# Patient Record
Sex: FEMALE | Race: WHITE | NOT HISPANIC OR LATINO | Employment: OTHER | URBAN - METROPOLITAN AREA
[De-identification: names, ages, dates, MRNs, and addresses within clinical notes are randomized per-mention and may not be internally consistent; named-entity substitution may affect disease eponyms.]

---

## 2017-11-01 ENCOUNTER — TRANSCRIBE ORDERS (OUTPATIENT)
Dept: ADMINISTRATIVE | Facility: HOSPITAL | Age: 72
End: 2017-11-01

## 2017-11-01 DIAGNOSIS — R92.8 ABNORMAL MAMMOGRAPHY: Primary | ICD-10-CM

## 2017-12-15 ENCOUNTER — HOSPITAL ENCOUNTER (OUTPATIENT)
Dept: MAMMOGRAPHY | Facility: HOSPITAL | Age: 72
Discharge: HOME | End: 2017-12-15
Attending: SURGERY | Admitting: SURGERY

## 2017-12-15 DIAGNOSIS — R92.8 ABNORMAL MAMMOGRAPHY: ICD-10-CM

## 2017-12-15 PROCEDURE — G0204 DX MAMMO INCL CAD BI: HCPCS

## 2017-12-15 PROCEDURE — G0204 DX MAMMO INCL CAD BI: HCPCS | Performed by: RADIOLOGY

## 2017-12-15 PROCEDURE — G0279 TOMOSYNTHESIS, MAMMO: HCPCS | Performed by: RADIOLOGY

## 2017-12-15 PROCEDURE — G0279 TOMOSYNTHESIS, MAMMO: HCPCS

## 2018-04-16 ENCOUNTER — TRANSCRIBE ORDERS (OUTPATIENT)
Dept: ULTRASOUND IMAGING | Facility: HOSPITAL | Age: 73
End: 2018-04-16

## 2018-04-16 ENCOUNTER — HOSPITAL ENCOUNTER (OUTPATIENT)
Dept: ULTRASOUND IMAGING | Facility: HOSPITAL | Age: 73
Discharge: HOME | End: 2018-04-16
Admitting: NURSE PRACTITIONER

## 2018-04-16 DIAGNOSIS — M79.601 PAIN IN RIGHT ARM: Primary | ICD-10-CM

## 2018-04-16 DIAGNOSIS — M79.601 PAIN IN RIGHT ARM: ICD-10-CM

## 2018-04-16 PROCEDURE — 93971 EXTREMITY STUDY: CPT

## 2018-11-06 ENCOUNTER — TRANSCRIBE ORDERS (OUTPATIENT)
Dept: ADMINISTRATIVE | Facility: HOSPITAL | Age: 73
End: 2018-11-06

## 2018-11-06 DIAGNOSIS — Z12.31 VISIT FOR SCREENING MAMMOGRAM: Primary | ICD-10-CM

## 2019-02-01 ENCOUNTER — APPOINTMENT (OUTPATIENT)
Dept: MAMMOGRAPHY | Facility: HOSPITAL | Age: 74
End: 2019-02-01

## 2019-03-07 ENCOUNTER — HOSPITAL ENCOUNTER (OUTPATIENT)
Dept: MAMMOGRAPHY | Facility: HOSPITAL | Age: 74
Discharge: HOME | End: 2019-03-07
Admitting: NURSE PRACTITIONER

## 2019-03-07 DIAGNOSIS — Z12.31 VISIT FOR SCREENING MAMMOGRAM: ICD-10-CM

## 2019-03-07 PROCEDURE — 77063 BREAST TOMOSYNTHESIS BI: CPT

## 2019-03-07 PROCEDURE — 77063 BREAST TOMOSYNTHESIS BI: CPT | Performed by: RADIOLOGY

## 2019-03-07 PROCEDURE — 77067 SCR MAMMO BI INCL CAD: CPT | Performed by: RADIOLOGY

## 2019-03-07 PROCEDURE — 77067 SCR MAMMO BI INCL CAD: CPT

## 2019-12-13 ENCOUNTER — OFFICE VISIT (OUTPATIENT)
Dept: FAMILY MEDICINE CLINIC | Facility: CLINIC | Age: 74
End: 2019-12-13

## 2019-12-13 VITALS
OXYGEN SATURATION: 97 % | BODY MASS INDEX: 23.16 KG/M2 | SYSTOLIC BLOOD PRESSURE: 110 MMHG | TEMPERATURE: 98 F | DIASTOLIC BLOOD PRESSURE: 64 MMHG | HEART RATE: 72 BPM | WEIGHT: 139 LBS | HEIGHT: 65 IN

## 2019-12-13 DIAGNOSIS — H10.31 ACUTE BACTERIAL CONJUNCTIVITIS OF RIGHT EYE: ICD-10-CM

## 2019-12-13 DIAGNOSIS — R05.9 COUGH: ICD-10-CM

## 2019-12-13 DIAGNOSIS — J06.9 ACUTE URI: Primary | ICD-10-CM

## 2019-12-13 PROCEDURE — 99203 OFFICE O/P NEW LOW 30 MIN: CPT | Performed by: NURSE PRACTITIONER

## 2019-12-13 RX ORDER — LEVOTHYROXINE SODIUM 0.05 MG/1
TABLET ORAL
COMMUNITY
End: 2020-08-25 | Stop reason: SDUPTHER

## 2019-12-13 RX ORDER — PROPRANOLOL HYDROCHLORIDE 80 MG/1
CAPSULE, EXTENDED RELEASE ORAL
COMMUNITY
Start: 2019-12-04 | End: 2020-03-13 | Stop reason: SDUPTHER

## 2019-12-13 RX ORDER — DEXTROMETHORPHAN HYDROBROMIDE AND PROMETHAZINE HYDROCHLORIDE 15; 6.25 MG/5ML; MG/5ML
5 SYRUP ORAL 4 TIMES DAILY PRN
Qty: 90 ML | Refills: 0 | Status: SHIPPED | OUTPATIENT
Start: 2019-12-13 | End: 2020-01-13

## 2019-12-13 RX ORDER — ATORVASTATIN CALCIUM 10 MG/1
TABLET, FILM COATED ORAL
COMMUNITY
End: 2020-12-03 | Stop reason: SDUPTHER

## 2019-12-13 RX ORDER — CEFDINIR 300 MG/1
300 CAPSULE ORAL 2 TIMES DAILY
Qty: 14 CAPSULE | Refills: 0 | Status: SHIPPED | OUTPATIENT
Start: 2019-12-13 | End: 2019-12-20

## 2019-12-13 RX ORDER — POLYMYXIN B SULFATE AND TRIMETHOPRIM 1; 10000 MG/ML; [USP'U]/ML
1 SOLUTION OPHTHALMIC 4 TIMES DAILY
Qty: 10 ML | Refills: 0 | Status: SHIPPED | OUTPATIENT
Start: 2019-12-13 | End: 2019-12-16

## 2019-12-13 NOTE — PROGRESS NOTES
Subjective     Chief Complaint:    Chief Complaint   Patient presents with   • Establish Care   • Cough   • Nasal Congestion   • Eye Problem   • Eye Drainage       History of Present Illness:   Started having sore throat and cough on Monday. She was taking OTC medication. Last night she developed right lower cheek swelling. She notes she started having yellow drainage from the corner of her right eye. This morning she had drainage but eyelids were not stuck together. Voice was hoarse. Throat is still sore. No fever or chills.     Review of Systems   Constitutional: Negative for chills and fever.   HENT: Positive for congestion, ear pain, rhinorrhea, sinus pressure and sore throat.    Respiratory: Positive for cough. Negative for shortness of breath.    Cardiovascular: Negative for chest pain and palpitations.   Gastrointestinal: Positive for diarrhea. Negative for abdominal pain and nausea.   Neurological: Positive for headache. Negative for dizziness.   Psychiatric/Behavioral: Negative for depressed mood. The patient is not nervous/anxious.         I have reviewed and/or updated the patient's past medical, surgical, family, social history and problem list as appropriate.     Medications:    Current Outpatient Medications:   •  atorvastatin (LIPITOR) 10 MG tablet, atorvastatin 10 mg tablet  TAKE 1 TABLET DAILY, Disp: , Rfl:   •  levothyroxine (SYNTHROID, LEVOTHROID) 50 MCG tablet, levothyroxine 50 mcg tablet  TAKE 1 TABLET DAILY, Disp: , Rfl:   •  propranolol LA (INDERAL LA) 80 MG 24 hr capsule, , Disp: , Rfl:   •  Pseudoephedrine-APAP-DM (COLD/FLU DAYTIME PO), Take  by mouth., Disp: , Rfl:   •  cefdinir (OMNICEF) 300 MG capsule, Take 1 capsule by mouth 2 (Two) Times a Day for 7 days., Disp: 14 capsule, Rfl: 0  •  promethazine-dextromethorphan (PROMETHAZINE-DM) 6.25-15 MG/5ML syrup, Take 5 mL by mouth 4 (Four) Times a Day As Needed for Cough., Disp: 90 mL, Rfl: 0  •  trimethoprim-polymyxin b (POLYTRIM)  "26188-0.1 UNIT/ML-% ophthalmic solution, Administer 1 drop to the right eye 4 (Four) Times a Day for 5 days., Disp: 10 mL, Rfl: 0    Allergies:  No Known Allergies    Objective     Vital Signs:   Vitals:    12/13/19 0855   BP: 110/64   Pulse: 72   Temp: 98 °F (36.7 °C)   TempSrc: Temporal   SpO2: 97%   Weight: 63 kg (139 lb)   Height: 165.1 cm (65\")       Physical Exam:    Physical Exam   Constitutional: She is oriented to person, place, and time. She appears well-developed and well-nourished.   HENT:   Head: Normocephalic and atraumatic.   Right Ear: Tympanic membrane, external ear and ear canal normal.   Left Ear: Tympanic membrane, external ear and ear canal normal.   Nose: Congestion present.   Mouth/Throat: Uvula is midline and mucous membranes are normal. Posterior oropharyngeal erythema present.   Eyes: Right eye exhibits discharge. Right conjunctiva is injected.   Cardiovascular: Normal rate, regular rhythm and intact distal pulses. Exam reveals no gallop and no friction rub.   No murmur heard.  Pulmonary/Chest: Effort normal and breath sounds normal.   Coarse breath sounds, frequent cough   Abdominal: Soft. Bowel sounds are normal. There is no tenderness.   Lymphadenopathy:        Head (right side): No submental, no submandibular, no tonsillar, no preauricular and no posterior auricular adenopathy present.        Head (left side): No submental, no submandibular, no tonsillar, no preauricular and no posterior auricular adenopathy present.     She has no cervical adenopathy.   Neurological: She is alert and oriented to person, place, and time.   Skin: Skin is warm and dry.   Psychiatric: She has a normal mood and affect. Her behavior is normal.   Nursing note and vitals reviewed.      Assessment / Plan     Assessment/Plan:   Problem List Items Addressed This Visit     None      Visit Diagnoses     Acute URI    -  Primary    Relevant Medications    cefdinir (OMNICEF) 300 MG capsule    Cough        Relevant " Medications    promethazine-dextromethorphan (PROMETHAZINE-DM) 6.25-15 MG/5ML syrup    Acute bacterial conjunctivitis of right eye        Relevant Medications    cefdinir (OMNICEF) 300 MG capsule    trimethoprim-polymyxin b (POLYTRIM) 62064-4.1 UNIT/ML-% ophthalmic solution        -- likely h influenza infection given eye symptoms as well as URI symptoms.   -- omnicef, finish all anbx  -- polytrim eye drops, supportive care discussed  -- push fluids, prometh DM for cough    Follow up:  PRN    Electronically signed by MAKENZIE Thakkar   12/13/2019 9:04 AM      Please note that portions of this note may have been completed with a voice recognition program. Efforts were made to edit the dictations, but occasionally words are mistranscribed.

## 2019-12-16 ENCOUNTER — TELEPHONE (OUTPATIENT)
Dept: FAMILY MEDICINE CLINIC | Facility: CLINIC | Age: 74
End: 2019-12-16

## 2019-12-16 RX ORDER — OFLOXACIN 3 MG/ML
2 SOLUTION/ DROPS OPHTHALMIC 4 TIMES DAILY
Qty: 5 ML | Refills: 0 | Status: SHIPPED | OUTPATIENT
Start: 2019-12-16 | End: 2019-12-23

## 2020-01-13 ENCOUNTER — OFFICE VISIT (OUTPATIENT)
Dept: FAMILY MEDICINE CLINIC | Facility: CLINIC | Age: 75
End: 2020-01-13

## 2020-01-13 VITALS
HEART RATE: 64 BPM | BODY MASS INDEX: 22.82 KG/M2 | WEIGHT: 137 LBS | OXYGEN SATURATION: 98 % | DIASTOLIC BLOOD PRESSURE: 70 MMHG | SYSTOLIC BLOOD PRESSURE: 120 MMHG | TEMPERATURE: 97.4 F | HEIGHT: 65 IN

## 2020-01-13 DIAGNOSIS — E78.5 DYSLIPIDEMIA: ICD-10-CM

## 2020-01-13 DIAGNOSIS — E53.8 VITAMIN B12 DEFICIENCY: ICD-10-CM

## 2020-01-13 DIAGNOSIS — E03.9 ACQUIRED HYPOTHYROIDISM: Primary | ICD-10-CM

## 2020-01-13 PROCEDURE — 99214 OFFICE O/P EST MOD 30 MIN: CPT | Performed by: FAMILY MEDICINE

## 2020-01-13 RX ORDER — LANOLIN ALCOHOL/MO/W.PET/CERES
1000 CREAM (GRAM) TOPICAL DAILY
COMMUNITY

## 2020-01-13 RX ORDER — CHLORAL HYDRATE 500 MG
CAPSULE ORAL
COMMUNITY

## 2020-01-13 NOTE — PROGRESS NOTES
Established Patient        Chief Complaint:   Chief Complaint   Patient presents with   • Establish Care     former patient of [facility]        Faith Cisneros is a 74 y.o. female    History of Present Illness:   Here for establishment with a new primary care provider.  She has numerous chronic comorbid conditions, routinely followed in the past by primary care physician established with [facility].  She has known hypothyroidism, as well as dyslipidemia and vitamin B12 deficiency.  Patient states she is due for thyroid evaluation.  Denies any palpitations or chest pain.  Denies any orthopnea or swelling to her extremities.  She has maintained good urine output without hematuria or dysuria.  Denies any bright red blood or black or tarry stools.  She denies any chest pain, no exertional dyspnea.  Maintains a very active lifestyle, diverse diet.  Denies any dysphagia or aspiration.    Subjective     The following portions of the patient's history were reviewed and updated as appropriate: allergies, current medications, past family history, past medical history, past social history, past surgical history and problem list.    No Known Allergies    Review of Systems  1. Constitutional: Negative for fever. Negative for chills, diaphoresis, fatigue and unexpected weight change.   2. HENT: No dysphagia; no changes to vision/hearing/smell/taste; no epistaxis  3. Eyes: Negative for redness and visual disturbance.   4. Respiratory: negative for shortness of breath. Negative for chest pain . Negative for cough and chest tightness.   5. Cardiovascular: Negative for chest pain and palpitations.   6. Gastrointestinal: Negative for abdominal distention, abdominal pain and blood in stool.   7. Endocrine: Negative for cold intolerance and heat intolerance.   8. Genitourinary: Negative for difficulty urinating, dysuria and frequency.   9. Musculoskeletal: Negative for arthralgias, back pain and myalgias.   10. Skin:  "Negative for color change, rash and wound.   11. Neurological: Negative for syncope, weakness and headaches.   12. Hematological: Negative for adenopathy. Does not bruise/bleed easily.   13. Psychiatric/Behavioral: Negative for confusion. The patient is not nervous/anxious.    Objective     Physical Exam   Vital Signs: /70   Pulse 64   Temp 97.4 °F (36.3 °C)   Ht 165.1 cm (65\")   Wt 62.1 kg (137 lb)   SpO2 98%   BMI 22.80 kg/m²     General Appearance: alert, oriented x 3, no acute distress.  Skin: warm and dry.   HEENT: Atraumatic.  pupils round and reactive to light and accommodation, oral mucosa pink and moist.  Nares patent without epistaxis.  External auditory canals are patent tympanic membranes intact.  Neck: supple, no JVD, trachea midline.  No thyromegaly  Lungs: CTA, unlabored breathing effort.  Heart: RRR, normal S1 and S2, no S3, no rub.  Abdomen: soft, non-tender, no palpable bladder, present bowel sounds to auscultation ×4.  No guarding or rigidity.  No CVA tenderness.  Postsurgical scarring noted to the abdomen.  Extremities: no clubbing, cyanosis or edema.  Good range of motion actively and passively.  Symmetric muscle strength and development  Neuro: normal speech and mental status.  Cranial nerves II through XII intact.  No anosmia. DTR 2+; proprioception intact.  No focal motor/sensory deficits.    Assessment and Plan      Assessment:   Faith was seen today for establish care.    Diagnoses and all orders for this visit:    Acquired hypothyroidism  -     TSH  -     T4, Free    BMI 22.0-22.9, adult    Dyslipidemia  -     TSH  -     T4, Free  -     Comprehensive Metabolic Panel    Vitamin B12 deficiency        Plan:  Thyroid surveillance labs today.  Continue current thyroid supplementation dose.  Adjustment will be made based on lab results.    Vital signs stable, appears hemodynamically appropriate range.    Continue propanolol.    Continue statin therapy.    Continue B12 " "supplementation.  Discussion Summary:    Discussed plan of care in detail with pt today; pt verb understanding and agrees.  Follow up:  Return in about 6 months (around 7/13/2020) for medicare wellness.     Patient Instructions   Managing Your Hypertension  Hypertension is commonly called high blood pressure. This is when the force of your blood pressing against the walls of your arteries is too strong. Arteries are blood vessels that carry blood from your heart throughout your body. Hypertension forces the heart to work harder to pump blood, and may cause the arteries to become narrow or stiff. Having untreated or uncontrolled hypertension can cause heart attack, stroke, kidney disease, and other problems.  What are blood pressure readings?  A blood pressure reading consists of a higher number over a lower number. Ideally, your blood pressure should be below 120/80. The first (\"top\") number is called the systolic pressure. It is a measure of the pressure in your arteries as your heart beats. The second (\"bottom\") number is called the diastolic pressure. It is a measure of the pressure in your arteries as the heart relaxes.  What does my blood pressure reading mean?  Blood pressure is classified into four stages. Based on your blood pressure reading, your health care provider may use the following stages to determine what type of treatment you need, if any. Systolic pressure and diastolic pressure are measured in a unit called mm Hg.  Normal  · Systolic pressure: below 120.  · Diastolic pressure: below 80.  Elevated  · Systolic pressure: 120-129.  · Diastolic pressure: below 80.  Hypertension stage 1  · Systolic pressure: 130-139.  · Diastolic pressure: 80-89.  Hypertension stage 2  · Systolic pressure: 140 or above.  · Diastolic pressure: 90 or above.  What health risks are associated with hypertension?  Managing your hypertension is an important responsibility. Uncontrolled hypertension can lead to:  · A heart " attack.  · A stroke.  · A weakened blood vessel (aneurysm).  · Heart failure.  · Kidney damage.  · Eye damage.  · Metabolic syndrome.  · Memory and concentration problems.  What changes can I make to manage my hypertension?  Hypertension can be managed by making lifestyle changes and possibly by taking medicines. Your health care provider will help you make a plan to bring your blood pressure within a normal range.  Eating and drinking    · Eat a diet that is high in fiber and potassium, and low in salt (sodium), added sugar, and fat. An example eating plan is called the DASH (Dietary Approaches to Stop Hypertension) diet. To eat this way:  ? Eat plenty of fresh fruits and vegetables. Try to fill half of your plate at each meal with fruits and vegetables.  ? Eat whole grains, such as whole wheat pasta, brown rice, or whole grain bread. Fill about one quarter of your plate with whole grains.  ? Eat low-fat diary products.  ? Avoid fatty cuts of meat, processed or cured meats, and poultry with skin. Fill about one quarter of your plate with lean proteins such as fish, chicken without skin, beans, eggs, and tofu.  ? Avoid premade and processed foods. These tend to be higher in sodium, added sugar, and fat.  · Reduce your daily sodium intake. Most people with hypertension should eat less than 1,500 mg of sodium a day.  · Limit alcohol intake to no more than 1 drink a day for nonpregnant women and 2 drinks a day for men. One drink equals 12 oz of beer, 5 oz of wine, or 1½ oz of hard liquor.  Lifestyle  · Work with your health care provider to maintain a healthy body weight, or to lose weight. Ask what an ideal weight is for you.  · Get at least 30 minutes of exercise that causes your heart to beat faster (aerobic exercise) most days of the week. Activities may include walking, swimming, or biking.  · Include exercise to strengthen your muscles (resistance exercise), such as weight lifting, as part of your weekly exercise  routine. Try to do these types of exercises for 30 minutes at least 3 days a week.  · Do not use any products that contain nicotine or tobacco, such as cigarettes and e-cigarettes. If you need help quitting, ask your health care provider.  · Control any long-term (chronic) conditions you have, such as high cholesterol or diabetes.  Monitoring  · Monitor your blood pressure at home as told by your health care provider. Your personal target blood pressure may vary depending on your medical conditions, your age, and other factors.  · Have your blood pressure checked regularly, as often as told by your health care provider.  Working with your health care provider  · Review all the medicines you take with your health care provider because there may be side effects or interactions.  · Talk with your health care provider about your diet, exercise habits, and other lifestyle factors that may be contributing to hypertension.  · Visit your health care provider regularly. Your health care provider can help you create and adjust your plan for managing hypertension.  Will I need medicine to control my blood pressure?  Your health care provider may prescribe medicine if lifestyle changes are not enough to get your blood pressure under control, and if:  · Your systolic blood pressure is 130 or higher.  · Your diastolic blood pressure is 80 or higher.  Take medicines only as told by your health care provider. Follow the directions carefully. Blood pressure medicines must be taken as prescribed. The medicine does not work as well when you skip doses. Skipping doses also puts you at risk for problems.  Contact a health care provider if:  · You think you are having a reaction to medicines you have taken.  · You have repeated (recurrent) headaches.  · You feel dizzy.  · You have swelling in your ankles.  · You have trouble with your vision.  Get help right away if:  · You develop a severe headache or confusion.  · You have unusual  weakness or numbness, or you feel faint.  · You have severe pain in your chest or abdomen.  · You vomit repeatedly.  · You have trouble breathing.  Summary  · Hypertension is when the force of blood pumping through your arteries is too strong. If this condition is not controlled, it may put you at risk for serious complications.  · Your personal target blood pressure may vary depending on your medical conditions, your age, and other factors. For most people, a normal blood pressure is less than 120/80.  · Hypertension is managed by lifestyle changes, medicines, or both. Lifestyle changes include weight loss, eating a healthy, low-sodium diet, exercising more, and limiting alcohol.  This information is not intended to replace advice given to you by your health care provider. Make sure you discuss any questions you have with your health care provider.  Document Released: 09/11/2013 Document Revised: 11/15/2017 Document Reviewed: 11/15/2017  [employer] Interactive Patient Education © 2019 [employer]      Preventing High Cholesterol  Cholesterol is a waxy, fat-like substance that your body needs in small amounts. Your liver makes all the cholesterol that your body needs. Having high cholesterol (hypercholesterolemia) increases your risk for heart disease and stroke. Extra (excess) cholesterol comes from the food you eat, such as animal-based fat (saturated fat) from meat and some dairy products.  High cholesterol can often be prevented with diet and lifestyle changes. If you already have high cholesterol, you can control it with diet and lifestyle changes, as well as medicine.  What nutrition changes can be made?  · Eat less saturated fat. Foods that contain saturated fat include red meat and some dairy products.  · Avoid processed meats, like zabala and lunch meats.  · Avoid trans fats, which are found in margarine and some baked goods.  · Avoid foods and beverages that have added sugars.  · Eat more fruits,  vegetables, and whole grains.  · Choose healthy sources of protein, such as fish, poultry, and nuts.  · Choose healthy sources of fat, such as:  ? Nuts.  ? Vegetable oils, especially olive oil.  ? Fish that have healthy fats (omega-3 fatty acids), such as mackerel or salmon.  What lifestyle changes can be made?    · Lose weight if you are overweight. Losing 5-10 lb (2.3-4.5 kg) can help prevent or control high cholesterol and reduce your risk for diabetes and high blood pressure. Ask your health care provider to help you with a diet and exercise plan to safely lose weight.  · Get enough exercise. Do at least 150 minutes of moderate-intensity exercise each week.  ? You could do this in short exercise sessions several times a day, or you could do longer exercise sessions a few times a week. For example, you could take a brisk 10-minute walk or bike ride, 3 times a day, for 5 days a week.  · Do not smoke. If you need help quitting, ask your health care provider.  · Limit your alcohol intake. If you drink alcohol, limit alcohol intake to no more than 1 drink a day for nonpregnant women and 2 drinks a day for men. One drink equals 12 oz of beer, 5 oz of wine, or 1½ oz of hard liquor.  Why are these changes important?    If you have high cholesterol, deposits (plaques) may build up on the walls of your blood vessels. Plaques make the arteries narrower and stiffer, which can restrict or block blood flow and cause blood clots to form. This greatly increases your risk for heart attack and stroke. Making diet and lifestyle changes can reduce your risk for these life-threatening conditions.  What can I do to lower my risk?  · Manage your risk factors for high cholesterol. Talk with your health care provider about all of your risk factors and how to lower your risk.  · Manage other conditions that you have, such as diabetes or high blood pressure (hypertension).  · Have your cholesterol checked at regular intervals.  · Keep all  follow-up visits as told by your health care provider. This is important.  How is this treated?  In addition to diet and lifestyle changes, your health care provider may recommend medicines to help lower cholesterol, such as a medicine to reduce the amount of cholesterol made in your liver. You may need medicine if:  · Diet and lifestyle changes do not lower your cholesterol enough.  · You have high cholesterol and other risk factors for heart disease or stroke.  Take over-the-counter and prescription medicines only as told by your health care provider.  Where to find more information  · American Heart Association: www.heart.org/HEARTORG/Conditions/Cholesterol/Cholesterol_UCM_001089_SubHomePage.jsp  · National Heart, Lung, and Blood [place]: www.nhlbi.nih.gov/health/resources/heart/heart-cholesterol-hbc-what-html  Summary  · High cholesterol increases your risk for heart disease and stroke. By keeping your cholesterol level low, you can reduce your risk for these conditions.  · Diet and lifestyle changes are the most important steps in preventing high cholesterol.  · Work with your health care provider to manage your risk factors, and have your blood tested regularly.  This information is not intended to replace advice given to you by your health care provider. Make sure you discuss any questions you have with your health care provider.  Document Released: 01/01/2017 Document Revised: 08/26/2017 Document Reviewed: 08/26/2017  [employer] Interactive Patient Education © 2019 [employer]      Exercising to Stay Healthy  To become healthy and stay healthy, it is recommended that you do moderate-intensity and vigorous-intensity exercise. You can tell that you are exercising at a moderate intensity if your heart starts beating faster and you start breathing faster but can still hold a conversation. You can tell that you are exercising at a vigorous intensity if you are breathing much harder and faster and cannot hold a  conversation while exercising.  Exercising regularly is important. It has many health benefits, such as:  · Improving overall fitness, flexibility, and endurance.  · Increasing bone density.  · Helping with weight control.  · Decreasing body fat.  · Increasing muscle strength.  · Reducing stress and tension.  · Improving overall health.  How often should I exercise?  Choose an activity that you enjoy, and set realistic goals. Your health care provider can help you make an activity plan that works for you.  Exercise regularly as told by your health care provider. This may include:  · Doing strength training two times a week, such as:  ? Lifting weights.  ? Using resistance bands.  ? Push-ups.  ? Sit-ups.  ? Yoga.  · Doing a certain intensity of exercise for a given amount of time. Choose from these options:  ? A total of 150 minutes of moderate-intensity exercise every week.  ? A total of 75 minutes of vigorous-intensity exercise every week.  ? A mix of moderate-intensity and vigorous-intensity exercise every week.  Children, pregnant women, people who have not exercised regularly, people who are overweight, and older adults may need to talk with a health care provider about what activities are safe to do. If you have a medical condition, be sure to talk with your health care provider before you start a new exercise program.  What are some exercise ideas?  Moderate-intensity exercise ideas include:  · Walking 1 mile (1.6 km) in about 15 minutes.  · Biking.  · Hiking.  · Golfing.  · Dancing.  · Water aerobics.  Vigorous-intensity exercise ideas include:  · Walking 4.5 miles (7.2 km) or more in about 1 hour.  · Jogging or running 5 miles (8 km) in about 1 hour.  · Biking 10 miles (16.1 km) or more in about 1 hour.  · Lap swimming.  · Roller-skating or in-line skating.  · Cross-country skiing.  · Vigorous competitive sports, such as football, basketball, and soccer.  · Jumping rope.  · Aerobic dancing.  What are some  everyday activities that can help me to get exercise?  · Yard work, such as:  ? Pushing a .  ? Raking and bagging leaves.  · Washing your car.  · Pushing a stroller.  · Shoveling snow.  · Gardening.  · Washing windows or floors.  How can I be more active in my day-to-day activities?  · Use stairs instead of an elevator.  · Take a walk during your lunch break.  · If you drive, park your car farther away from your work or school.  · If you take public transportation, get off one stop early and walk the rest of the way.  · Stand up or walk around during all of your indoor phone calls.  · Get up, stretch, and walk around every 30 minutes throughout the day.  · Enjoy exercise with a friend. Support to continue exercising will help you keep a regular routine of activity.  What guidelines can I follow while exercising?  · Before you start a new exercise program, talk with your health care provider.  · Do not exercise so much that you hurt yourself, feel dizzy, or get very short of breath.  · Wear comfortable clothes and wear shoes with good support.  · Drink plenty of water while you exercise to prevent dehydration or heat stroke.  · Work out until your breathing and your heartbeat get faster.  Where to find more information  · [place] Department of Health and Human Services: www.hhs.gov  · Centers for Disease Control and Prevention (CDC): www.cdc.gov  Summary  · Exercising regularly is important. It will improve your overall fitness, flexibility, and endurance.  · Regular exercise also will improve your overall health. It can help you control your weight, reduce stress, and improve your bone density.  · Do not exercise so much that you hurt yourself, feel dizzy, or get very short of breath.  · Before you start a new exercise program, talk with your health care provider.  This information is not intended to replace advice given to you by your health care provider. Make sure you discuss any questions you have with  your health care provider.  Document Released: 01/20/2012 Document Revised: 11/08/2018 Document Reviewed: 11/08/2018  [employer] Interactive Patient Education © 2019 [employer]          Cristiano Cat DO  01/27/20  8:10 AM          Please note that portions of this note may have been completed with a voice recognition program. Efforts were made to edit the dictations, but occasionally words are mistranscribed.

## 2020-01-14 LAB
ALBUMIN SERPL-MCNC: 4.6 G/DL (ref 3.5–5.2)
ALBUMIN/GLOB SERPL: 2.2 G/DL
ALP SERPL-CCNC: 87 U/L (ref 39–117)
ALT SERPL-CCNC: 14 U/L (ref 1–33)
AST SERPL-CCNC: 20 U/L (ref 1–32)
BILIRUB SERPL-MCNC: 0.7 MG/DL (ref 0.2–1.2)
BUN SERPL-MCNC: 8 MG/DL (ref 8–23)
BUN/CREAT SERPL: 10.1 (ref 7–25)
CALCIUM SERPL-MCNC: 9.6 MG/DL (ref 8.6–10.5)
CHLORIDE SERPL-SCNC: 96 MMOL/L (ref 98–107)
CO2 SERPL-SCNC: 27 MMOL/L (ref 22–29)
CREAT SERPL-MCNC: 0.79 MG/DL (ref 0.57–1)
GLOBULIN SER CALC-MCNC: 2.1 GM/DL
GLUCOSE SERPL-MCNC: 91 MG/DL (ref 65–99)
POTASSIUM SERPL-SCNC: 5.1 MMOL/L (ref 3.5–5.2)
PROT SERPL-MCNC: 6.7 G/DL (ref 6–8.5)
SODIUM SERPL-SCNC: 134 MMOL/L (ref 136–145)
T4 FREE SERPL-MCNC: 1.51 NG/DL (ref 0.93–1.7)
TSH SERPL DL<=0.005 MIU/L-ACNC: 2.81 UIU/ML (ref 0.27–4.2)

## 2020-01-27 NOTE — PATIENT INSTRUCTIONS
"Managing Your Hypertension  Hypertension is commonly called high blood pressure. This is when the force of your blood pressing against the walls of your arteries is too strong. Arteries are blood vessels that carry blood from your heart throughout your body. Hypertension forces the heart to work harder to pump blood, and may cause the arteries to become narrow or stiff. Having untreated or uncontrolled hypertension can cause heart attack, stroke, kidney disease, and other problems.  What are blood pressure readings?  A blood pressure reading consists of a higher number over a lower number. Ideally, your blood pressure should be below 120/80. The first (\"top\") number is called the systolic pressure. It is a measure of the pressure in your arteries as your heart beats. The second (\"bottom\") number is called the diastolic pressure. It is a measure of the pressure in your arteries as the heart relaxes.  What does my blood pressure reading mean?  Blood pressure is classified into four stages. Based on your blood pressure reading, your health care provider may use the following stages to determine what type of treatment you need, if any. Systolic pressure and diastolic pressure are measured in a unit called mm Hg.  Normal  · Systolic pressure: below 120.  · Diastolic pressure: below 80.  Elevated  · Systolic pressure: 120-129.  · Diastolic pressure: below 80.  Hypertension stage 1  · Systolic pressure: 130-139.  · Diastolic pressure: 80-89.  Hypertension stage 2  · Systolic pressure: 140 or above.  · Diastolic pressure: 90 or above.  What health risks are associated with hypertension?  Managing your hypertension is an important responsibility. Uncontrolled hypertension can lead to:  · A heart attack.  · A stroke.  · A weakened blood vessel (aneurysm).  · Heart failure.  · Kidney damage.  · Eye damage.  · Metabolic syndrome.  · Memory and concentration problems.  What changes can I make to manage my " hypertension?  Hypertension can be managed by making lifestyle changes and possibly by taking medicines. Your health care provider will help you make a plan to bring your blood pressure within a normal range.  Eating and drinking    · Eat a diet that is high in fiber and potassium, and low in salt (sodium), added sugar, and fat. An example eating plan is called the DASH (Dietary Approaches to Stop Hypertension) diet. To eat this way:  ? Eat plenty of fresh fruits and vegetables. Try to fill half of your plate at each meal with fruits and vegetables.  ? Eat whole grains, such as whole wheat pasta, brown rice, or whole grain bread. Fill about one quarter of your plate with whole grains.  ? Eat low-fat diary products.  ? Avoid fatty cuts of meat, processed or cured meats, and poultry with skin. Fill about one quarter of your plate with lean proteins such as fish, chicken without skin, beans, eggs, and tofu.  ? Avoid premade and processed foods. These tend to be higher in sodium, added sugar, and fat.  · Reduce your daily sodium intake. Most people with hypertension should eat less than 1,500 mg of sodium a day.  · Limit alcohol intake to no more than 1 drink a day for nonpregnant women and 2 drinks a day for men. One drink equals 12 oz of beer, 5 oz of wine, or 1½ oz of hard liquor.  Lifestyle  · Work with your health care provider to maintain a healthy body weight, or to lose weight. Ask what an ideal weight is for you.  · Get at least 30 minutes of exercise that causes your heart to beat faster (aerobic exercise) most days of the week. Activities may include walking, swimming, or biking.  · Include exercise to strengthen your muscles (resistance exercise), such as weight lifting, as part of your weekly exercise routine. Try to do these types of exercises for 30 minutes at least 3 days a week.  · Do not use any products that contain nicotine or tobacco, such as cigarettes and e-cigarettes. If you need help quitting,  ask your health care provider.  · Control any long-term (chronic) conditions you have, such as high cholesterol or diabetes.  Monitoring  · Monitor your blood pressure at home as told by your health care provider. Your personal target blood pressure may vary depending on your medical conditions, your age, and other factors.  · Have your blood pressure checked regularly, as often as told by your health care provider.  Working with your health care provider  · Review all the medicines you take with your health care provider because there may be side effects or interactions.  · Talk with your health care provider about your diet, exercise habits, and other lifestyle factors that may be contributing to hypertension.  · Visit your health care provider regularly. Your health care provider can help you create and adjust your plan for managing hypertension.  Will I need medicine to control my blood pressure?  Your health care provider may prescribe medicine if lifestyle changes are not enough to get your blood pressure under control, and if:  · Your systolic blood pressure is 130 or higher.  · Your diastolic blood pressure is 80 or higher.  Take medicines only as told by your health care provider. Follow the directions carefully. Blood pressure medicines must be taken as prescribed. The medicine does not work as well when you skip doses. Skipping doses also puts you at risk for problems.  Contact a health care provider if:  · You think you are having a reaction to medicines you have taken.  · You have repeated (recurrent) headaches.  · You feel dizzy.  · You have swelling in your ankles.  · You have trouble with your vision.  Get help right away if:  · You develop a severe headache or confusion.  · You have unusual weakness or numbness, or you feel faint.  · You have severe pain in your chest or abdomen.  · You vomit repeatedly.  · You have trouble breathing.  Summary  · Hypertension is when the force of blood pumping  through your arteries is too strong. If this condition is not controlled, it may put you at risk for serious complications.  · Your personal target blood pressure may vary depending on your medical conditions, your age, and other factors. For most people, a normal blood pressure is less than 120/80.  · Hypertension is managed by lifestyle changes, medicines, or both. Lifestyle changes include weight loss, eating a healthy, low-sodium diet, exercising more, and limiting alcohol.  This information is not intended to replace advice given to you by your health care provider. Make sure you discuss any questions you have with your health care provider.  Document Released: 09/11/2013 Document Revised: 11/15/2017 Document Reviewed: 11/15/2017  [employer] Interactive Patient Education © 2019 [employer]      Preventing High Cholesterol  Cholesterol is a waxy, fat-like substance that your body needs in small amounts. Your liver makes all the cholesterol that your body needs. Having high cholesterol (hypercholesterolemia) increases your risk for heart disease and stroke. Extra (excess) cholesterol comes from the food you eat, such as animal-based fat (saturated fat) from meat and some dairy products.  High cholesterol can often be prevented with diet and lifestyle changes. If you already have high cholesterol, you can control it with diet and lifestyle changes, as well as medicine.  What nutrition changes can be made?  · Eat less saturated fat. Foods that contain saturated fat include red meat and some dairy products.  · Avoid processed meats, like zabala and lunch meats.  · Avoid trans fats, which are found in margarine and some baked goods.  · Avoid foods and beverages that have added sugars.  · Eat more fruits, vegetables, and whole grains.  · Choose healthy sources of protein, such as fish, poultry, and nuts.  · Choose healthy sources of fat, such as:  ? Nuts.  ? Vegetable oils, especially olive oil.  ? Fish that have  healthy fats (omega-3 fatty acids), such as mackerel or salmon.  What lifestyle changes can be made?    · Lose weight if you are overweight. Losing 5-10 lb (2.3-4.5 kg) can help prevent or control high cholesterol and reduce your risk for diabetes and high blood pressure. Ask your health care provider to help you with a diet and exercise plan to safely lose weight.  · Get enough exercise. Do at least 150 minutes of moderate-intensity exercise each week.  ? You could do this in short exercise sessions several times a day, or you could do longer exercise sessions a few times a week. For example, you could take a brisk 10-minute walk or bike ride, 3 times a day, for 5 days a week.  · Do not smoke. If you need help quitting, ask your health care provider.  · Limit your alcohol intake. If you drink alcohol, limit alcohol intake to no more than 1 drink a day for nonpregnant women and 2 drinks a day for men. One drink equals 12 oz of beer, 5 oz of wine, or 1½ oz of hard liquor.  Why are these changes important?    If you have high cholesterol, deposits (plaques) may build up on the walls of your blood vessels. Plaques make the arteries narrower and stiffer, which can restrict or block blood flow and cause blood clots to form. This greatly increases your risk for heart attack and stroke. Making diet and lifestyle changes can reduce your risk for these life-threatening conditions.  What can I do to lower my risk?  · Manage your risk factors for high cholesterol. Talk with your health care provider about all of your risk factors and how to lower your risk.  · Manage other conditions that you have, such as diabetes or high blood pressure (hypertension).  · Have your cholesterol checked at regular intervals.  · Keep all follow-up visits as told by your health care provider. This is important.  How is this treated?  In addition to diet and lifestyle changes, your health care provider may recommend medicines to help lower  cholesterol, such as a medicine to reduce the amount of cholesterol made in your liver. You may need medicine if:  · Diet and lifestyle changes do not lower your cholesterol enough.  · You have high cholesterol and other risk factors for heart disease or stroke.  Take over-the-counter and prescription medicines only as told by your health care provider.  Where to find more information  · American Heart Association: www.heart.org/HEARTORG/Conditions/Cholesterol/Cholesterol_UCM_001089_SubHomePage.jsp  · National Heart, Lung, and [employer] [place]: www.nhlbi.nih.gov/health/resources/heart/heart-cholesterol-hbc-what-html  Summary  · High cholesterol increases your risk for heart disease and stroke. By keeping your cholesterol level low, you can reduce your risk for these conditions.  · Diet and lifestyle changes are the most important steps in preventing high cholesterol.  · Work with your health care provider to manage your risk factors, and have your blood tested regularly.  This information is not intended to replace advice given to you by your health care provider. Make sure you discuss any questions you have with your health care provider.  Document Released: 01/01/2017 Document Revised: 08/26/2017 Document Reviewed: 08/26/2017  [employer] Interactive Patient Education © 2019 [employer]      Exercising to Stay Healthy  To become healthy and stay healthy, it is recommended that you do moderate-intensity and vigorous-intensity exercise. You can tell that you are exercising at a moderate intensity if your heart starts beating faster and you start breathing faster but can still hold a conversation. You can tell that you are exercising at a vigorous intensity if you are breathing much harder and faster and cannot hold a conversation while exercising.  Exercising regularly is important. It has many health benefits, such as:  · Improving overall fitness, flexibility, and endurance.  · Increasing bone density.  · Helping  with weight control.  · Decreasing body fat.  · Increasing muscle strength.  · Reducing stress and tension.  · Improving overall health.  How often should I exercise?  Choose an activity that you enjoy, and set realistic goals. Your health care provider can help you make an activity plan that works for you.  Exercise regularly as told by your health care provider. This may include:  · Doing strength training two times a week, such as:  ? Lifting weights.  ? Using resistance bands.  ? Push-ups.  ? Sit-ups.  ? Yoga.  · Doing a certain intensity of exercise for a given amount of time. Choose from these options:  ? A total of 150 minutes of moderate-intensity exercise every week.  ? A total of 75 minutes of vigorous-intensity exercise every week.  ? A mix of moderate-intensity and vigorous-intensity exercise every week.  Children, pregnant women, people who have not exercised regularly, people who are overweight, and older adults may need to talk with a health care provider about what activities are safe to do. If you have a medical condition, be sure to talk with your health care provider before you start a new exercise program.  What are some exercise ideas?  Moderate-intensity exercise ideas include:  · Walking 1 mile (1.6 km) in about 15 minutes.  · Biking.  · Hiking.  · Golfing.  · Dancing.  · Water aerobics.  Vigorous-intensity exercise ideas include:  · Walking 4.5 miles (7.2 km) or more in about 1 hour.  · Jogging or running 5 miles (8 km) in about 1 hour.  · Biking 10 miles (16.1 km) or more in about 1 hour.  · Lap swimming.  · Roller-skating or in-line skating.  · Cross-country skiing.  · Vigorous competitive sports, such as football, basketball, and soccer.  · Jumping rope.  · Aerobic dancing.  What are some everyday activities that can help me to get exercise?  · Yard work, such as:  ? Pushing a .  ? Raking and bagging leaves.  · Washing your car.  · Pushing a stroller.  · Shoveling  snow.  · Gardening.  · Washing windows or floors.  How can I be more active in my day-to-day activities?  · Use stairs instead of an elevator.  · Take a walk during your lunch break.  · If you drive, park your car farther away from your work or school.  · If you take public transportation, get off one stop early and walk the rest of the way.  · Stand up or walk around during all of your indoor phone calls.  · Get up, stretch, and walk around every 30 minutes throughout the day.  · Enjoy exercise with a friend. Support to continue exercising will help you keep a regular routine of activity.  What guidelines can I follow while exercising?  · Before you start a new exercise program, talk with your health care provider.  · Do not exercise so much that you hurt yourself, feel dizzy, or get very short of breath.  · Wear comfortable clothes and wear shoes with good support.  · Drink plenty of water while you exercise to prevent dehydration or heat stroke.  · Work out until your breathing and your heartbeat get faster.  Where to find more information  · [place] Department of Health and Human Services: www.hhs.gov  · Centers for Disease Control and Prevention (CDC): www.cdc.gov  Summary  · Exercising regularly is important. It will improve your overall fitness, flexibility, and endurance.  · Regular exercise also will improve your overall health. It can help you control your weight, reduce stress, and improve your bone density.  · Do not exercise so much that you hurt yourself, feel dizzy, or get very short of breath.  · Before you start a new exercise program, talk with your health care provider.  This information is not intended to replace advice given to you by your health care provider. Make sure you discuss any questions you have with your health care provider.  Document Released: 01/20/2012 Document Revised: 11/08/2018 Document Reviewed: 11/08/2018  [employer] Interactive Patient Education © 2019 [employer]

## 2020-03-13 RX ORDER — PROPRANOLOL HYDROCHLORIDE 80 MG/1
80 CAPSULE, EXTENDED RELEASE ORAL DAILY
Qty: 90 CAPSULE | Refills: 2 | Status: SHIPPED | OUTPATIENT
Start: 2020-03-13 | End: 2020-12-03 | Stop reason: SDUPTHER

## 2020-03-13 NOTE — TELEPHONE ENCOUNTER
Pt came by office req refills on her propranolol 80mg for 90 day supply. Please send refills to [facility].

## 2020-07-21 ENCOUNTER — OFFICE VISIT (OUTPATIENT)
Dept: FAMILY MEDICINE CLINIC | Facility: CLINIC | Age: 75
End: 2020-07-21

## 2020-07-21 VITALS
WEIGHT: 136 LBS | BODY MASS INDEX: 22.66 KG/M2 | TEMPERATURE: 98.2 F | HEIGHT: 65 IN | SYSTOLIC BLOOD PRESSURE: 122 MMHG | HEART RATE: 56 BPM | OXYGEN SATURATION: 100 % | DIASTOLIC BLOOD PRESSURE: 80 MMHG

## 2020-07-21 DIAGNOSIS — E03.9 ACQUIRED HYPOTHYROIDISM: ICD-10-CM

## 2020-07-21 DIAGNOSIS — E78.5 DYSLIPIDEMIA: ICD-10-CM

## 2020-07-21 DIAGNOSIS — Z00.00 ROUTINE GENERAL MEDICAL EXAMINATION AT HEALTH CARE FACILITY: Primary | ICD-10-CM

## 2020-07-21 PROCEDURE — G0439 PPPS, SUBSEQ VISIT: HCPCS | Performed by: FAMILY MEDICINE

## 2020-07-21 NOTE — PATIENT INSTRUCTIONS
Breast Self-Awareness  Breast self-awareness means being familiar with how your breasts look and feel. It involves checking your breasts regularly and reporting any changes to your health care provider.  Practicing breast self-awareness is important. Sometimes changes may not be harmful (are benign), but sometimes a change in your breasts can be a sign of a serious medical problem. It is important to learn how to do this procedure correctly so that you can catch problems early, when treatment is more likely to be successful. All women should practice breast self-awareness, including women who have had breast implants.  What you need:  · A mirror.  · A well-lit room.  How to do a breast self-exam  A breast self-exam is one way to learn what is normal for your breasts and whether your breasts are changing. To do a breast self-exam:  Look for changes    1. Remove all the clothing above your waist.  2.  front of a mirror in a room with good lighting.  3. Put your hands on your hips.  4. Push your hands firmly downward.  5. Compare your breasts in the mirror. Look for differences between them (asymmetry), such as:  ? Differences in shape.  ? Differences in size.  ? Puckers, dips, and bumps in one breast and not the other.  6. Look at each breast for changes in the skin, such as:  ? Redness.  ? Scaly areas.  7. Look for changes in your nipples, such as:  ? Discharge.  ? Bleeding.  ? Dimpling.  ? Redness.  ? A change in position.  Feel for changes  Carefully feel your breasts for lumps and changes. It is best to do this while lying on your back on the floor, and again while sitting or standing in the tub or shower with soapy water on your skin. Feel each breast in the following way:  1. Place the arm on the side of the breast you are examining above your head.  2. Feel your breast with the other hand.  3. Start in the nipple area and make ¾-inch (2 cm) overlapping circles to feel your breast. Use the pads of  your three middle fingers to do this. Apply light pressure, then medium pressure, then firm pressure. The light pressure will allow you to feel the tissue closest to the skin. The medium pressure will allow you to feel the tissue that is a little deeper. The firm pressure will allow you to feel the tissue close to the ribs.  4. Continue the overlapping circles, moving downward over the breast until you feel your ribs below your breast.  5. Move one finger-width toward the center of the body. Continue to use the ¾-inch (2 cm) overlapping circles to feel your breast as you move slowly up toward your collarbone.  6. Continue the up-and-down exam using all three pressures until you reach your armpit.    Write down what you find  Writing down what you find can help you remember what to discuss with your health care provider. Write down:  · What is normal for each breast.  · Any changes that you find in each breast, including:  ? The kind of changes you find.  ? Any pain or tenderness.  ? Size and location of any lumps.  · Where you are in your menstrual cycle, if you are still menstruating.  General tips and recommendations  · Examine your breasts every month.  · If you are breastfeeding, the best time to examine your breasts is after a feeding or after using a breast pump.  · If you menstruate, the best time to examine your breasts is 5-7 days after your period. Breasts are generally lumpier during menstrual periods, and it may be more difficult to notice changes.  · With time and practice, you will become more familiar with the variations in your breasts and more comfortable with the exam.  Contact a health care provider if you:  · See a change in the shape or size of your breasts or nipples.  · See a change in the skin of your breast or nipples, such as a reddened or scaly area.  · Have unusual discharge from your nipples.  · Find a lump or thick area that was not there before.  · Have pain in your breasts.  · Have  any concerns related to your breast health.  Summary  · Breast self-awareness includes looking for physical changes in your breasts, as well as feeling for any changes within your breasts.  · Breast self-awareness should be performed in front of a mirror in a well-lit room.  · You should examine your breasts every month. If you menstruate, the best time to examine your breasts is 5-7 days after your menstrual period.  · Let your health care provider know of any changes you notice in your breasts, including changes in size, changes on the skin, pain or tenderness, or unusual fluid from your nipples.  This information is not intended to replace advice given to you by your health care provider. Make sure you discuss any questions you have with your health care provider.  Document Released: 12/18/2006 Document Revised: 08/06/2019 Document Reviewed: 08/06/2019  Elsevier Patient Education © 2020 [employer]      Breast Self-Awareness  Breast self-awareness is knowing how your breasts look and feel. Doing breast self-awareness is important. It allows you to catch a breast problem early while it is still small and can be treated. All women should do breast self-awareness, including women who have had breast implants. Tell your doctor if you notice a change in your breasts.  What you need:  · A mirror.  · A well-lit room.  How to do a breast self-exam  A breast self-exam is one way to learn what is normal for your breasts and to check for changes. To do a breast self-exam:  Look for changes    1. Take off all the clothes above your waist.  2.  front of a mirror in a room with good lighting.  3. Put your hands on your hips.  4. Push your hands down.  5. Look at your breasts and nipples in the mirror to see if one breast or nipple looks different from the other. Check to see if:  ? The shape of one breast is different.  ? The size of one breast is different.  ? There are wrinkles, dips, and bumps in one breast and  not the other.  6. Look at each breast for changes in the skin, such as:  ? Redness.  ? Scaly areas.  7. Look for changes in your nipples, such as:  ? Liquid around the nipples.  ? Bleeding.  ? Dimpling.  ? Redness.  ? A change in where the nipples are.  Feel for changes    1. Lie on your back on the floor.  2. Feel each breast. To do this, follow these steps:  ? Pick a breast to feel.  ? Put the arm closest to that breast above your head.  ? Use your other arm to feel the nipple area of your breast. Feel the area with the pads of your three middle fingers by making small circles with your fingers. For the first [place], press lightly. For the second [place], press harder. For the third [place], press even harder.  ? Keep making circles with your fingers at the different pressures as you move down your breast. Stop when you feel your ribs.  ? Move your fingers a little toward the center of your body.  ? Start making circles with your fingers again, this time going up until you reach your collarbone.  ? Keep making up-and-down circles until you reach your armpit. Remember to keep using the three pressures.  ? Feel the other breast in the same way.  3. Sit or  the tub or shower.  4. With soapy water on your skin, feel each breast the same way you did in step 2 when you were lying on the floor.  Write down what you find  Writing down what you find can help you remember what to tell your doctor. Write down:  · What is normal for each breast.  · Any changes you find in each breast, including:  ? The kind of changes you find.  ? Whether you have pain.  ? Size and location of any lumps.  · When you last had your menstrual period.  General tips  · Check your breasts every month.  · If you are breastfeeding, the best time to check your breasts is after you feed your baby or after you use a breast pump.  · If you get menstrual periods, the best time to check your breasts is 5-7 days after your menstrual period is  over.  · With time, you will become comfortable with the self-exam, and you will begin to know if there are changes in your breasts.  Contact a doctor if you:  · See a change in the shape or size of your breasts or nipples.  · See a change in the skin of your breast or nipples, such as red or scaly skin.  · Have fluid coming from your nipples that is not normal.  · Find a lump or thick area that was not there before.  · Have pain in your breasts.  · Have any concerns about your breast health.  Summary  · Breast self-awareness includes looking for changes in your breasts, as well as feeling for changes within your breasts.  · Breast self-awareness should be done in front of a mirror in a well-lit room.  · You should check your breasts every month. If you get menstrual periods, the best time to check your breasts is 5-7 days after your menstrual period is over.  · Let your doctor know of any changes you see in your breasts, including changes in size, changes on the skin, pain or tenderness, or fluid from your nipples that is not normal.  This information is not intended to replace advice given to you by your health care provider. Make sure you discuss any questions you have with your health care provider.  Document Released: 06/05/2009 Document Revised: 08/06/2019 Document Reviewed: 08/06/2019  [employer] Patient Education © 2020 [employer]      Exercising to Lose Weight  Exercise is structured, repetitive physical activity to improve fitness and health. Getting regular exercise is important for everyone. It is especially important if you are overweight. Being overweight increases your risk of heart disease, stroke, diabetes, high blood pressure, and several types of cancer. Reducing your calorie intake and exercising can help you lose weight.  Exercise is usually categorized as moderate or vigorous intensity. To lose weight, most people need to do a certain amount of moderate-intensity or vigorous-intensity exercise  each week.  Moderate-intensity exercise    Moderate-intensity exercise is any activity that gets you moving enough to burn at least three times more energy (calories) than if you were sitting.  Examples of moderate exercise include:  · Walking a mile in 15 minutes.  · Doing light yard work.  · Biking at an easy pace.  Most people should get at least 150 minutes (2 hours and 30 minutes) a week of moderate-intensity exercise to maintain their body weight.  Vigorous-intensity exercise  Vigorous-intensity exercise is any activity that gets you moving enough to burn at least six times more calories than if you were sitting. When you exercise at this intensity, you should be working hard enough that you are not able to carry on a conversation.  Examples of vigorous exercise include:  · Running.  · Playing a team sport, such as football, basketball, and soccer.  · Jumping rope.  Most people should get at least 75 minutes (1 hour and 15 minutes) a week of vigorous-intensity exercise to maintain their body weight.  How can exercise affect me?  When you exercise enough to burn more calories than you eat, you lose weight. Exercise also reduces body fat and builds muscle. The more muscle you have, the more calories you burn. Exercise also:  · Improves mood.  · Reduces stress and tension.  · Improves your overall fitness, flexibility, and endurance.  · Increases bone strength.  The amount of exercise you need to lose weight depends on:  · Your age.  · The type of exercise.  · Any health conditions you have.  · Your overall physical ability.  Talk to your health care provider about how much exercise you need and what types of activities are safe for you.  What actions can I take to lose weight?  Nutrition    · Make changes to your diet as told by your health care provider or diet and nutrition specialist (dietitian). This may include:  ? Eating fewer calories.  ? Eating more protein.  ? Eating less unhealthy fats.  ? Eating a  diet that includes fresh fruits and vegetables, whole grains, low-fat dairy products, and lean protein.  ? Avoiding foods with added fat, salt, and sugar.  · Drink plenty of water while you exercise to prevent dehydration or heat stroke.  Activity  · Choose an activity that you enjoy and set realistic goals. Your health care provider can help you make an exercise plan that works for you.  · Exercise at a moderate or vigorous intensity most days of the week.  ? The intensity of exercise may vary from person to person. You can tell how intense a workout is for you by paying attention to your breathing and heartbeat. Most people will notice their breathing and heartbeat get faster with more intense exercise.  · Do resistance training twice each week, such as:  ? Push-ups.  ? Sit-ups.  ? Lifting weights.  ? Using resistance bands.  · Getting short amounts of exercise can be just as helpful as long structured periods of exercise. If you have trouble finding time to exercise, try to include exercise in your daily routine.  ? Get up, stretch, and walk around every 30 minutes throughout the day.  ? Go for a walk during your lunch break.  ? Park your car farther away from your destination.  ? If you take public transportation, get off one stop early and walk the rest of the way.  ? Make phone calls while standing up and walking around.  ? Take the stairs instead of elevators or escalators.  · Wear comfortable clothes and shoes with good support.  · Do not exercise so much that you hurt yourself, feel dizzy, or get very short of breath.  Where to find more information  · [place] Department of Health and Human Services: www.hhs.gov  · Centers for Disease Control and Prevention (CDC): www.cdc.gov  Contact a health care provider:  · Before starting a new exercise program.  · If you have questions or concerns about your weight.  · If you have a medical problem that keeps you from exercising.  Get help right away if you have any of  the following while exercising:  · Injury.  · Dizziness.  · Difficulty breathing or shortness of breath that does not go away when you stop exercising.  · Chest pain.  · Rapid heartbeat.  Summary  · Being overweight increases your risk of heart disease, stroke, diabetes, high blood pressure, and several types of cancer.  · Losing weight happens when you burn more calories than you eat.  · Reducing the amount of calories you eat in addition to getting regular moderate or vigorous exercise each week helps you lose weight.  This information is not intended to replace advice given to you by your health care provider. Make sure you discuss any questions you have with your health care provider.  Document Released: 01/20/2012 Document Revised: 12/31/2018 Document Reviewed: 12/31/2018  [employer] Patient Education © 2020 [employer]      Exercising to Stay Healthy  To become healthy and stay healthy, it is recommended that you do moderate-intensity and vigorous-intensity exercise. You can tell that you are exercising at a moderate intensity if your heart starts beating faster and you start breathing faster but can still hold a conversation. You can tell that you are exercising at a vigorous intensity if you are breathing much harder and faster and cannot hold a conversation while exercising.  Exercising regularly is important. It has many health benefits, such as:  · Improving overall fitness, flexibility, and endurance.  · Increasing bone density.  · Helping with weight control.  · Decreasing body fat.  · Increasing muscle strength.  · Reducing stress and tension.  · Improving overall health.  How often should I exercise?  Choose an activity that you enjoy, and set realistic goals. Your health care provider can help you make an activity plan that works for you.  Exercise regularly as told by your health care provider. This may include:  · Doing strength training two times a week, such as:  ? Lifting weights.  ? Using  resistance bands.  ? Push-ups.  ? Sit-ups.  ? Yoga.  · Doing a certain intensity of exercise for a given amount of time. Choose from these options:  ? A total of 150 minutes of moderate-intensity exercise every week.  ? A total of 75 minutes of vigorous-intensity exercise every week.  ? A mix of moderate-intensity and vigorous-intensity exercise every week.  Children, pregnant women, people who have not exercised regularly, people who are overweight, and older adults may need to talk with a health care provider about what activities are safe to do. If you have a medical condition, be sure to talk with your health care provider before you start a new exercise program.  What are some exercise ideas?  Moderate-intensity exercise ideas include:  · Walking 1 mile (1.6 km) in about 15 minutes.  · Biking.  · Hiking.  · Golfing.  · Dancing.  · Water aerobics.  Vigorous-intensity exercise ideas include:  · Walking 4.5 miles (7.2 km) or more in about 1 hour.  · Jogging or running 5 miles (8 km) in about 1 hour.  · Biking 10 miles (16.1 km) or more in about 1 hour.  · Lap swimming.  · Roller-skating or in-line skating.  · Cross-country skiing.  · Vigorous competitive sports, such as football, basketball, and soccer.  · Jumping rope.  · Aerobic dancing.  What are some everyday activities that can help me to get exercise?  · Yard work, such as:  ? Pushing a .  ? Raking and bagging leaves.  · Washing your car.  · Pushing a stroller.  · Shoveling snow.  · Gardening.  · Washing windows or floors.  How can I be more active in my day-to-day activities?  · Use stairs instead of an elevator.  · Take a walk during your lunch break.  · If you drive, park your car farther away from your work or school.  · If you take public transportation, get off one stop early and walk the rest of the way.  · Stand up or walk around during all of your indoor phone calls.  · Get up, stretch, and walk around every 30 minutes throughout the  day.  · Enjoy exercise with a friend. Support to continue exercising will help you keep a regular routine of activity.  What guidelines can I follow while exercising?  · Before you start a new exercise program, talk with your health care provider.  · Do not exercise so much that you hurt yourself, feel dizzy, or get very short of breath.  · Wear comfortable clothes and wear shoes with good support.  · Drink plenty of water while you exercise to prevent dehydration or heat stroke.  · Work out until your breathing and your heartbeat get faster.  Where to find more information  · [place] Department of Health and Human Services: www.hhs.gov  · Centers for Disease Control and Prevention (CDC): www.cdc.gov  Summary  · Exercising regularly is important. It will improve your overall fitness, flexibility, and endurance.  · Regular exercise also will improve your overall health. It can help you control your weight, reduce stress, and improve your bone density.  · Do not exercise so much that you hurt yourself, feel dizzy, or get very short of breath.  · Before you start a new exercise program, talk with your health care provider.  This information is not intended to replace advice given to you by your health care provider. Make sure you discuss any questions you have with your health care provider.  Document Released: 01/20/2012 Document Revised: 11/30/2018 Document Reviewed: 11/08/2018  [employer] Patient Education © 2020 [employer]      Fall Prevention in the Home, Adult  Falls can cause injuries and can affect people from all age groups. There are many simple things that you can do to make your home safe and to help prevent falls. Ask for help when making these changes, if needed.  What actions can I take to prevent falls?  General instructions  · Use good lighting in all rooms. Replace any light bulbs that burn out.  · Turn on lights if it is dark. Use night-lights.  · Place frequently used items in easy-to-reach places.  Lower the shelves around your home if necessary.  · Set up furniture so that there are clear paths around it. Avoid moving your furniture around.  · Remove throw rugs and other tripping hazards from the floor.  · Avoid walking on wet floors.  · Fix any uneven floor surfaces.  · Add color or contrast paint or tape to grab bars and handrails in your home. Place contrasting color strips on the first and last steps of stairways.  · When you use a stepladder, make sure that it is completely opened and that the sides are firmly locked. Have someone hold the ladder while you are using it. Do not climb a closed stepladder.  · Be aware of any and all pets.  What can I do in the bathroom?         · Keep the floor dry. Immediately clean up any water that spills onto the floor.  · Remove soap buildup in the tub or shower on a regular basis.  · Use non-skid mats or decals on the floor of the tub or shower.  · Attach bath mats securely with double-sided, non-slip rug tape.  · If you need to sit down while you are in the shower, use a plastic, non-slip stool.  · Install grab bars by the toilet and in the tub and shower. Do not use towel bars as grab bars.  What can I do in the bedroom?  · Make sure that a bedside light is easy to reach.  · Do not use oversized bedding that drapes onto the floor.  · Have a firm chair that has side arms to use for getting dressed.  What can I do in the kitchen?  · Clean up any spills right away.  · If you need to reach for something above you, use a sturdy step stool that has a grab bar.  · Keep electrical cables out of the way.  · Do not use floor polish or wax that makes floors slippery. If you must use wax, make sure that it is non-skid floor wax.  What can I do in the stairways?  · Do not leave any items on the stairs.  · Make sure that you have a light switch at the top of the stairs and the bottom of the stairs. Have them installed if you do not have them.  · Make sure that there are handrails  on both sides of the stairs. Fix handrails that are broken or loose. Make sure that handrails are as long as the stairways.  · Install non-slip stair treads on all stairs in your home.  · Avoid having throw rugs at the top or bottom of stairways, or secure the rugs with carpet tape to prevent them from moving.  · Choose a carpet design that does not hide the edge of steps on the stairway.  · Check any carpeting to make sure that it is firmly attached to the stairs. Fix any carpet that is loose or worn.  What can I do on the outside of my home?  · Use bright outdoor lighting.  · Regularly repair the edges of walkways and driveways and fix any cracks.  · Remove high doorway thresholds.  · Trim any shrubbery on the main path into your home.  · Regularly check that handrails are securely fastened and in good repair. Both sides of any steps should have handrails.  · Install guardrails along the edges of any raised decks or porches.  · Clear walkways of debris and clutter, including tools and rocks.  · Have leaves, snow, and ice cleared regularly.  · Use sand or salt on walkways during winter months.  · In the garage, clean up any spills right away, including grease or oil spills.  What other actions can I take?  · Wear closed-toe shoes that fit well and support your feet. Wear shoes that have rubber soles or low heels.  · Use mobility aids as needed, such as canes, walkers, scooters, and crutches.  · Review your medicines with your health care provider. Some medicines can cause dizziness or changes in blood pressure, which increase your risk of falling.  Talk with your health care provider about other ways that you can decrease your risk of falls. This may include working with a physical therapist or  to improve your strength, balance, and endurance.  Where to find more information  · Centers for Disease Control and Prevention[place]: https://www.cdc.gov  · [employer] [place] on Aging:  https://vu5eoob.michelle.nih.gov  Contact a health care provider if:  · You are afraid of falling at home.  · You feel weak, drowsy, or dizzy at home.  · You fall at home.  Summary  · There are many simple things that you can do to make your home safe and to help prevent falls.  · Ways to make your home safe include removing tripping hazards and installing grab bars in the bathroom.  · Ask for help when making these changes in your home.  This information is not intended to replace advice given to you by your health care provider. Make sure you discuss any questions you have with your health care provider.  Document Released: 12/08/2003 Document Revised: 11/30/2018 Document Reviewed: 08/02/2018  Elsevier Patient Education © 2020 [employer]      Fall Prevention in the Home, Adult  Falls can cause injuries. They can happen to people of all ages. There are many things you can do to make your home safe and to help prevent falls. Ask for help when making these changes, if needed.  What actions can I take to prevent falls?  General Instructions  · Use good lighting in all rooms. Replace any light bulbs that burn out.  · Turn on the lights when you go into a dark area. Use night-lights.  · Keep items that you use often in easy-to-reach places. Lower the shelves around your home if necessary.  · Set up your furniture so you have a clear path. Avoid moving your furniture around.  · Do not have throw rugs and other things on the floor that can make you trip.  · Avoid walking on wet floors.  · If any of your floors are uneven, fix them.  · Add color or contrast paint or tape to clearly marshal and help you see:  ? Any grab bars or handrails.  ? First and last steps of stairways.  ? Where the edge of each step is.  · If you use a stepladder:  ? Make sure that it is fully opened. Do not climb a closed stepladder.  ? Make sure that both sides of the stepladder are locked into place.  ? Ask someone to hold the stepladder for you while  you use it.  · If there are any pets around you, be aware of where they are.  What can I do in the bathroom?         · Keep the floor dry. Clean up any water that spills onto the floor as soon as it happens.  · Remove soap buildup in the tub or shower regularly.  · Use non-skid mats or decals on the floor of the tub or shower.  · Attach bath mats securely with double-sided, non-slip rug tape.  · If you need to sit down in the shower, use a plastic, non-slip stool.  · Install grab bars by the toilet and in the tub and shower. Do not use towel bars as grab bars.  What can I do in the bedroom?  · Make sure that you have a light by your bed that is easy to reach.  · Do not use any sheets or blankets that are too big for your bed. They should not hang down onto the floor.  · Have a firm chair that has side arms. You can use this for support while you get dressed.  What can I do in the kitchen?  · Clean up any spills right away.  · If you need to reach something above you, use a strong step stool that has a grab bar.  · Keep electrical cords out of the way.  · Do not use floor polish or wax that makes floors slippery. If you must use wax, use non-skid floor wax.  What can I do with my stairs?  · Do not leave any items on the stairs.  · Make sure that you have a light switch at the top of the stairs and the bottom of the stairs. If you do not have them, ask someone to add them for you.  · Make sure that there are handrails on both sides of the stairs, and use them. Fix handrails that are broken or loose. Make sure that handrails are as long as the stairways.  · Install non-slip stair treads on all stairs in your home.  · Avoid having throw rugs at the top or bottom of the stairs. If you do have throw rugs, attach them to the floor with carpet tape.  · Choose a carpet that does not hide the edge of the steps on the stairway.  · Check any carpeting to make sure that it is firmly attached to the stairs. Fix any carpet that is  loose or worn.  What can I do on the outside of my home?  · Use bright outdoor lighting.  · Regularly fix the edges of walkways and driveways and fix any cracks.  · Remove anything that might make you trip as you walk through a door, such as a raised step or threshold.  · Trim any bushes or trees on the path to your home.  · Regularly check to see if handrails are loose or broken. Make sure that both sides of any steps have handrails.  · Install guardrails along the edges of any raised decks and porches.  · Clear walking paths of anything that might make someone trip, such as tools or rocks.  · Have any leaves, snow, or ice cleared regularly.  · Use sand or salt on walking paths during winter.  · Clean up any spills in your garage right away. This includes grease or oil spills.  What other actions can I take?  · Wear shoes that:  ? Have a low heel. Do not wear high heels.  ? Have rubber bottoms.  ? Are comfortable and fit you well.  ? Are closed at the toe. Do not wear open-toe sandals.  · Use tools that help you move around (mobility aids) if they are needed. These include:  ? Canes.  ? Walkers.  ? Scooters.  ? Crutches.  · Review your medicines with your doctor. Some medicines can make you feel dizzy. This can increase your chance of falling.  Ask your doctor what other things you can do to help prevent falls.  Where to find more information  · Centers for Disease Control and Prevention, STEADI: https://cdc.gov  · National [place] on Aging: https://aw5lpsg.michelle.nih.gov  Contact a doctor if:  · You are afraid of falling at home.  · You feel weak, drowsy, or dizzy at home.  · You fall at home.  Summary  · There are many simple things that you can do to make your home safe and to help prevent falls.  · Ways to make your home safe include removing tripping hazards and installing grab bars in the bathroom.  · Ask for help when making these changes in your home.  This information is not intended to replace advice given  to you by your health care provider. Make sure you discuss any questions you have with your health care provider.  Document Released: 10/14/2010 Document Revised: 04/09/2020 Document Reviewed: 08/02/2018  Elsevier Patient Education © 2020 [employer]

## 2020-07-21 NOTE — PROGRESS NOTES
The ABCs of the Annual Wellness Visit  Subsequent Medicare Wellness Visit    Chief Complaint   Patient presents with   • Medicare Wellness-subsequent       Subjective   History of Present Illness:  Faith Cisneros is a 75 y.o. female who presents for a Subsequent Medicare Wellness Visit.    HEALTH RISK ASSESSMENT    Recent Hospitalizations:  No hospitalization(s) within the last year.    Current Medical Providers:  Patient Care Team:  Kendra Mcgovern APRN as PCP - General (Family Medicine)    Smoking Status:  Social History     Tobacco Use   Smoking Status Never Smoker   Smokeless Tobacco Never Used       Alcohol Consumption:  Social History     Substance and Sexual Activity   Alcohol Use No       Depression Screen:   PHQ-2/PHQ-9 Depression Screening 7/21/2020   Little interest or pleasure in doing things 0   Feeling down, depressed, or hopeless 0   Total Score 0       Fall Risk Screen:  STEADI Fall Risk Assessment was completed, and patient is at LOW risk for falls.Assessment completed on:7/21/2020    Health Habits and Functional and Cognitive Screening:  Functional & Cognitive Status 7/21/2020   Do you have difficulty preparing food and eating? No   Do you have difficulty bathing yourself, getting dressed or grooming yourself? No   Do you have difficulty using the toilet? No   Do you have difficulty moving around from place to place? No   Do you have trouble with steps or getting out of a bed or a chair? No   Current Diet Well Balanced Diet   Dental Exam Up to date   Eye Exam Not up to date   Exercise (times per week) 7 times per week   Current Exercise Activities Include Walking   Do you need help using the phone?  No   Are you deaf or do you have serious difficulty hearing?  No   Do you need help with transportation? No   Do you need help shopping? No   Do you need help preparing meals?  No   Do you need help with housework?  No   Do you need help with laundry? No   Do you need help taking your  medications? No   Do you need help managing money? No   Do you ever drive or ride in a car without wearing a seat belt? No   Have you felt unusual stress, anger or loneliness in the last month? No   Who do you live with? Spouse   If you need help, do you have trouble finding someone available to you? No   Have you been bothered in the last four weeks by sexual problems? No   Do you have difficulty concentrating, remembering or making decisions? No         Does the patient have evidence of cognitive impairment? No    Asprin use counseling:Does not need ASA (and currently is not on it)    Age-appropriate Screening Schedule:  Refer to the list below for future screening recommendations based on patient's age, sex and/or medical conditions. Orders for these recommended tests are listed in the plan section. The patient has been provided with a written plan.    Health Maintenance   Topic Date Due   • LIPID PANEL  12/13/2019   • INFLUENZA VACCINE  08/01/2020   • MAMMOGRAM  03/07/2021   • COLONOSCOPY  12/08/2025   • TDAP/TD VACCINES (3 - Td) 01/08/2029   • ZOSTER VACCINE  Completed          The following portions of the patient's history were reviewed and updated as appropriate: allergies, current medications, past family history, past medical history, past social history, past surgical history and problem list.    Outpatient Medications Prior to Visit   Medication Sig Dispense Refill   • atorvastatin (LIPITOR) 10 MG tablet atorvastatin 10 mg tablet   TAKE 1 TABLET DAILY     • BIOTIN PO Take  by mouth.     • Cholecalciferol (VITAMIN D3 PO) Take 25 mcg by mouth.     • levothyroxine (SYNTHROID, LEVOTHROID) 50 MCG tablet levothyroxine 50 mcg tablet   TAKE 1 TABLET DAILY     • Omega-3 Fatty Acids (FISH OIL) 1000 MG capsule capsule Take  by mouth Daily With Breakfast.     • Probiotic Product (PROBIOTIC DAILY PO) Take  by mouth.     • propranolol LA (INDERAL LA) 80 MG 24 hr capsule Take 1 capsule by mouth Daily. 90 capsule 2   •  Psyllium (METAMUCIL FIBER PO) Take  by mouth.     • vitamin B-12 (CYANOCOBALAMIN) 1000 MCG tablet Take 1,000 mcg by mouth Daily.       No facility-administered medications prior to visit.        Patient Active Problem List   Diagnosis   • Acquired hypothyroidism   • BMI 22.0-22.9, adult   • Dyslipidemia   • Vitamin B12 deficiency       Advanced Care Planning:  ACP discussion was held with the patient during this visit. Patient has an advance directive in EMR which is still valid.     Review of Systems   Constitutional: Negative for activity change, appetite change, chills, diaphoresis, fatigue, fever and unexpected weight change.   HENT: Negative for congestion, dental problem, drooling, ear discharge, ear pain, facial swelling, hearing loss, mouth sores, nosebleeds, postnasal drip, rhinorrhea, sinus pressure, sneezing, sore throat, tinnitus, trouble swallowing and voice change.    Eyes: Negative for photophobia, pain, discharge, redness, itching and visual disturbance.   Respiratory: Negative for apnea, cough, choking, chest tightness, shortness of breath, wheezing and stridor.    Cardiovascular: Negative for chest pain, palpitations and leg swelling.   Gastrointestinal: Negative for abdominal distention, abdominal pain, anal bleeding, blood in stool, constipation, diarrhea, nausea, rectal pain and vomiting.   Endocrine: Negative for cold intolerance, heat intolerance, polydipsia, polyphagia and polyuria.   Genitourinary: Negative for decreased urine volume, difficulty urinating, dysuria, enuresis, flank pain, frequency, genital sores, hematuria and urgency.   Musculoskeletal: Negative for arthralgias, back pain, gait problem, joint swelling, myalgias, neck pain and neck stiffness.   Skin: Negative for color change, pallor, rash and wound.   Allergic/Immunologic: Negative for food allergies and immunocompromised state.   Neurological: Negative for dizziness, tremors, seizures, syncope, facial asymmetry, speech  "difficulty, weakness, light-headedness, numbness and headaches.   Hematological: Negative for adenopathy. Does not bruise/bleed easily.   Psychiatric/Behavioral: Negative for agitation, behavioral problems, confusion, decreased concentration, dysphoric mood, hallucinations, self-injury, sleep disturbance and suicidal ideas. The patient is not nervous/anxious and is not hyperactive.        Compared to one year ago, the patient feels her physical health is better.  Compared to one year ago, the patient feels her mental health is better.    Reviewed chart for potential of high risk medication in the elderly: yes  Reviewed chart for potential of harmful drug interactions in the elderly:yes    Objective         Vitals:    07/21/20 1044   BP: 122/80   Pulse: 56   Temp: 98.2 °F (36.8 °C)   SpO2: 100%   Weight: 61.7 kg (136 lb)   Height: 165.1 cm (65\")   PainSc: 0-No pain       Body mass index is 22.63 kg/m².  Discussed the patient's BMI with her. The BMI is in the acceptable range.    Physical Exam   Constitutional: She is oriented to person, place, and time. She appears well-developed and well-nourished. No distress.   HENT:   Head: Normocephalic and atraumatic.   Right Ear: External ear normal.   Left Ear: External ear normal.   Nose: Nose normal.   Mouth/Throat: Oropharynx is clear and moist. No oropharyngeal exudate.   Eyes: Pupils are equal, round, and reactive to light. Conjunctivae and EOM are normal. Right eye exhibits no discharge. Left eye exhibits no discharge. No scleral icterus.   Neck: Normal range of motion. Neck supple. No JVD present. No tracheal deviation present. No thyromegaly present.   Cardiovascular: Normal rate, normal heart sounds and intact distal pulses. Exam reveals no gallop and no friction rub.   No murmur heard.  Pulmonary/Chest: Effort normal and breath sounds normal. No stridor. No respiratory distress. She has no wheezes. She has no rales. She exhibits no tenderness.   Abdominal: Soft. " Bowel sounds are normal. She exhibits no distension and no mass. There is no tenderness. There is no rebound and no guarding. No hernia.   Genitourinary:   Genitourinary Comments: Pt defers   Musculoskeletal: Normal range of motion. She exhibits no edema, tenderness or deformity.   Lymphadenopathy:     She has no cervical adenopathy.   Neurological: She is alert and oriented to person, place, and time. She has normal reflexes. She displays normal reflexes. No cranial nerve deficit. She exhibits normal muscle tone. Coordination normal.   Skin: Skin is warm. No rash noted. She is not diaphoretic. No erythema. No pallor.   Psychiatric: She has a normal mood and affect. Her behavior is normal. Judgment and thought content normal.   Nursing note and vitals reviewed.            Assessment/Plan   Medicare Risks and Personalized Health Plan  CMS Preventative Services Quick Reference  Advance Directive Discussion  Cardiovascular risk    The above risks/problems have been discussed with the patient.  Pertinent information has been shared with the patient in the After Visit Summary.  Follow up plans and orders are seen below in the Assessment/Plan Section.    Diagnoses and all orders for this visit:    1. Routine general medical examination at health care facility (Primary)  -     Comprehensive Metabolic Panel  -     TSH  -     T4, Free    2. Acquired hypothyroidism  -     Comprehensive Metabolic Panel  -     TSH  -     T4, Free    3. Dyslipidemia  -     Comprehensive Metabolic Panel  -     TSH  -     T4, Free      Follow Up:  Return in 6 months (on 1/21/2021).     An After Visit Summary and PPPS were given to the patient.

## 2020-07-22 LAB
ALBUMIN SERPL-MCNC: 4.4 G/DL (ref 3.5–5.2)
ALBUMIN/GLOB SERPL: 2.2 G/DL
ALP SERPL-CCNC: 86 U/L (ref 39–117)
ALT SERPL-CCNC: 9 U/L (ref 1–33)
AST SERPL-CCNC: 14 U/L (ref 1–32)
BILIRUB SERPL-MCNC: 0.6 MG/DL (ref 0–1.2)
BUN SERPL-MCNC: 7 MG/DL (ref 8–23)
BUN/CREAT SERPL: 8 (ref 7–25)
CALCIUM SERPL-MCNC: 9.3 MG/DL (ref 8.6–10.5)
CHLORIDE SERPL-SCNC: 104 MMOL/L (ref 98–107)
CO2 SERPL-SCNC: 26.4 MMOL/L (ref 22–29)
CREAT SERPL-MCNC: 0.88 MG/DL (ref 0.57–1)
GLOBULIN SER CALC-MCNC: 2 GM/DL
GLUCOSE SERPL-MCNC: 95 MG/DL (ref 65–99)
POTASSIUM SERPL-SCNC: 5.1 MMOL/L (ref 3.5–5.2)
PROT SERPL-MCNC: 6.4 G/DL (ref 6–8.5)
SODIUM SERPL-SCNC: 139 MMOL/L (ref 136–145)
T4 FREE SERPL-MCNC: 1.64 NG/DL (ref 0.93–1.7)
TSH SERPL DL<=0.005 MIU/L-ACNC: 2.04 UIU/ML (ref 0.27–4.2)

## 2020-08-25 RX ORDER — LEVOTHYROXINE SODIUM 0.05 MG/1
50 TABLET ORAL DAILY
Qty: 30 TABLET | Refills: 2 | Status: SHIPPED | OUTPATIENT
Start: 2020-08-25 | End: 2020-12-03 | Stop reason: SDUPTHER

## 2020-08-25 NOTE — TELEPHONE ENCOUNTER
Pt stopped by office req refills on her levothyroxine 50mcg. [facility]

## 2020-10-12 ENCOUNTER — OFFICE VISIT (OUTPATIENT)
Dept: FAMILY MEDICINE CLINIC | Facility: CLINIC | Age: 75
End: 2020-10-12

## 2020-10-12 VITALS
HEART RATE: 74 BPM | DIASTOLIC BLOOD PRESSURE: 70 MMHG | BODY MASS INDEX: 22.99 KG/M2 | OXYGEN SATURATION: 98 % | WEIGHT: 138 LBS | HEIGHT: 65 IN | TEMPERATURE: 97.5 F | SYSTOLIC BLOOD PRESSURE: 110 MMHG

## 2020-10-12 DIAGNOSIS — N39.0 ACUTE UTI: ICD-10-CM

## 2020-10-12 DIAGNOSIS — R30.0 DYSURIA: Primary | ICD-10-CM

## 2020-10-12 LAB
BILIRUB BLD-MCNC: NEGATIVE MG/DL
CLARITY, POC: CLEAR
COLOR UR: YELLOW
GLUCOSE UR STRIP-MCNC: NEGATIVE MG/DL
KETONES UR QL: NEGATIVE
LEUKOCYTE EST, POC: ABNORMAL
NITRITE UR-MCNC: NEGATIVE MG/ML
PH UR: 6.5 [PH] (ref 5–8)
PROT UR STRIP-MCNC: ABNORMAL MG/DL
RBC # UR STRIP: ABNORMAL /UL
SP GR UR: 1.01 (ref 1–1.03)
UROBILINOGEN UR QL: NORMAL

## 2020-10-12 PROCEDURE — 99213 OFFICE O/P EST LOW 20 MIN: CPT | Performed by: FAMILY MEDICINE

## 2020-10-12 PROCEDURE — 81003 URINALYSIS AUTO W/O SCOPE: CPT | Performed by: FAMILY MEDICINE

## 2020-10-12 RX ORDER — FLUCONAZOLE 100 MG/1
100 TABLET ORAL DAILY
Qty: 3 TABLET | Refills: 0 | Status: SHIPPED | OUTPATIENT
Start: 2020-10-12 | End: 2020-10-15

## 2020-10-12 RX ORDER — SULFAMETHOXAZOLE AND TRIMETHOPRIM 800; 160 MG/1; MG/1
1 TABLET ORAL 2 TIMES DAILY
Qty: 20 TABLET | Refills: 0 | Status: SHIPPED | OUTPATIENT
Start: 2020-10-12 | End: 2020-10-22

## 2020-10-12 NOTE — PROGRESS NOTES
Established Patient        Chief Complaint:   Chief Complaint   Patient presents with   • Follow-up     pelivc pressure; urinary urgency; painful urination        Faith Cisneros is a 75 y.o. female    History of Present Illness:   Here today with complaints of pelvic discomfort and urinary urgency with burning.  She denies any gross hematuria, although has had urinary tract infections in the past which did produce gross hematuria.  She denies any fever, chills or night sweats.  Mild nausea.  She denies any significant bowel changes.  No abnormal rashes.  Has a history of urinary tract infections in the past, responds well to Bactrim.    Subjective     The following portions of the patient's history were reviewed and updated as appropriate: allergies, current medications, past family history, past medical history, past social history, past surgical history and problem list.    No Known Allergies    Review of Systems  1. Constitutional: Negative for fever. Negative for chills, diaphoresis, fatigue and unexpected weight change.   2. HENT: No dysphagia; no changes to vision/hearing/smell/taste; no epistaxis  3. Eyes: Negative for redness and visual disturbance.   4. Respiratory: negative for shortness of breath. Negative for chest pain . Negative for cough and chest tightness.   5. Cardiovascular: Negative for chest pain and palpitations.   6. Gastrointestinal: Negative for abdominal distention, abdominal pain and blood in stool.  Mild nausea.  7. Endocrine: Negative for cold intolerance and heat intolerance.   8. Genitourinary: As per above.  9. Musculoskeletal: Negative for arthralgias, back pain and myalgias.   10. Skin: Negative for color change, rash and wound.   11. Neurological: Negative for syncope, weakness and headaches.   12. Hematological: Negative for adenopathy. Does not bruise/bleed easily.   13. Psychiatric/Behavioral: Negative for confusion. The patient is not nervous/anxious.    Objective  "    Physical Exam   Vital Signs: /70   Pulse 74   Temp 97.5 °F (36.4 °C)   Ht 165.1 cm (65\")   Wt 62.6 kg (138 lb)   SpO2 98%   BMI 22.96 kg/m²     General Appearance: alert, oriented x 3, no acute distress.  Skin: warm and dry.   HEENT: Atraumatic.  pupils round and reactive to light and accommodation, oral mucosa pink and moist.  Nares patent without epistaxis.  External auditory canals are patent tympanic membranes intact.  Neck: supple, no JVD, trachea midline.  No thyromegaly  Lungs: CTA, unlabored breathing effort.  Heart: RRR, normal S1 and S2, no S3, no rub.  Abdomen: soft, non-tender, no palpable bladder, present bowel sounds to auscultation ×4.  No guarding or rigidity.  No CVA tenderness.  Extremities: no clubbing, cyanosis or edema.  Good range of motion actively and passively.  Symmetric muscle strength and development  Neuro: normal speech and mental status.  Cranial nerves II through XII intact.  No anosmia. DTR 2+; proprioception intact.  No focal motor/sensory deficits.    Assessment and Plan      Assessment:   Faith was seen today for follow-up.    Diagnoses and all orders for this visit:    Dysuria  -     POCT urinalysis dipstick, automated  -     Urine Culture - Urine, Urine, Clean Catch    Acute UTI  -     Urine Culture - Urine, Urine, Clean Catch  -     sulfamethoxazole-trimethoprim (BACTRIM DS,SEPTRA DS) 800-160 MG per tablet; Take 1 tablet by mouth 2 (Two) Times a Day for 10 days.  -     fluconazole (Diflucan) 100 MG tablet; Take 1 tablet by mouth Daily for 3 days.        Plan:  Plan treatment with a 10-day course of Bactrim.  Plan to follow urinary culture, change in treatment regimen if needed.  She is provided a 3-day course of Diflucan should she need it.    I have stressed the importance of maintaining adequate hydration.  She can utilize Tylenol over-the-counter for any discomfort.  Return to the clinic should she not improve, or if she worsens.  Discussion " Summary:    Discussed plan of care in detail with pt today; pt verb understanding and agrees.  Follow up:  No follow-ups on file.     There are no Patient Instructions on file for this visit.    Cristiano Cat DO  10/12/20  09:40 EDT          Please note that portions of this note may have been completed with a voice recognition program. Efforts were made to edit the dictations, but occasionally words are mistranscribed.

## 2020-10-14 LAB
BACTERIA UR CULT: NORMAL
BACTERIA UR CULT: NORMAL

## 2020-12-03 ENCOUNTER — TELEPHONE (OUTPATIENT)
Dept: FAMILY MEDICINE CLINIC | Facility: CLINIC | Age: 75
End: 2020-12-03

## 2020-12-03 RX ORDER — PROPRANOLOL HYDROCHLORIDE 80 MG/1
80 CAPSULE, EXTENDED RELEASE ORAL DAILY
Qty: 90 CAPSULE | Refills: 3 | Status: SHIPPED | OUTPATIENT
Start: 2020-12-03 | End: 2021-11-11

## 2020-12-03 RX ORDER — LEVOTHYROXINE SODIUM 0.05 MG/1
50 TABLET ORAL DAILY
Qty: 30 TABLET | Refills: 2 | Status: SHIPPED | OUTPATIENT
Start: 2020-12-03 | End: 2020-12-04 | Stop reason: SDUPTHER

## 2020-12-03 RX ORDER — ATORVASTATIN CALCIUM 10 MG/1
10 TABLET, FILM COATED ORAL DAILY
Qty: 90 TABLET | Refills: 3 | Status: SHIPPED | OUTPATIENT
Start: 2020-12-03 | End: 2021-11-11

## 2020-12-03 NOTE — TELEPHONE ENCOUNTER
REFILL:   SYNTHROID 50 MCG TABLETS  INDERAL LA CAPS 80 MG TABLETS  LIPITOR 10 MG TABLETS  ALL 90 DAY SUPPLIES    PLEASE SEND TO [facility]

## 2020-12-04 ENCOUNTER — TELEPHONE (OUTPATIENT)
Dept: FAMILY MEDICINE CLINIC | Facility: CLINIC | Age: 75
End: 2020-12-04

## 2020-12-04 RX ORDER — LEVOTHYROXINE SODIUM 0.05 MG/1
50 TABLET ORAL DAILY
Qty: 90 TABLET | Refills: 1 | Status: SHIPPED | OUTPATIENT
Start: 2020-12-04 | End: 2021-05-27 | Stop reason: SDUPTHER

## 2020-12-04 NOTE — TELEPHONE ENCOUNTER
Patient called and stated that her refill on Levothyroxine was not sent as 90 day supplies and [facility] requires 90 dy supply.    Please advise

## 2021-01-21 ENCOUNTER — OFFICE VISIT (OUTPATIENT)
Dept: FAMILY MEDICINE CLINIC | Facility: CLINIC | Age: 76
End: 2021-01-21

## 2021-01-21 VITALS
TEMPERATURE: 97.3 F | BODY MASS INDEX: 23.53 KG/M2 | HEART RATE: 61 BPM | SYSTOLIC BLOOD PRESSURE: 142 MMHG | WEIGHT: 141.2 LBS | OXYGEN SATURATION: 99 % | HEIGHT: 65 IN | DIASTOLIC BLOOD PRESSURE: 74 MMHG

## 2021-01-21 DIAGNOSIS — E53.8 VITAMIN B12 DEFICIENCY: ICD-10-CM

## 2021-01-21 DIAGNOSIS — E78.5 DYSLIPIDEMIA: ICD-10-CM

## 2021-01-21 DIAGNOSIS — E03.9 ACQUIRED HYPOTHYROIDISM: Primary | ICD-10-CM

## 2021-01-21 DIAGNOSIS — M76.32 IT BAND SYNDROME, LEFT: ICD-10-CM

## 2021-01-21 PROCEDURE — 99214 OFFICE O/P EST MOD 30 MIN: CPT | Performed by: FAMILY MEDICINE

## 2021-01-21 NOTE — PROGRESS NOTES
Established Patient        Chief Complaint:   Chief Complaint   Patient presents with   • Follow-up     Patient is here for 6 month follow up. She states she has no complaints        Faith Cisneros is a 75 y.o. female    History of Present Illness:   Patient is here for scheduled follow-up visit of her known dyslipidemia, hypothyroidism and B12 deficiency.  She denies any problems with her current medication regimen.  She denies any syncope, vertigo or palpitations, no dyspnea on exertion or chest pain.  Denies any aspiration or dysphagia.  No reports of any hematuria or dysuria.  Denies any bright red blood or black tarry stools.  She maintains a balanced dietary intake, appropriate hydration status.    Patient does admit to increased walking exercise program, up to 6 miles daily at present.  She and her  have utilize this in lieu of their inability to dance as they previously did quite often.  She reports periodic difficulty with lateral left thigh discomfort, associated anterior ankle discomfort on same extremity.  Denies any focal trauma.  Does seem to be associated with increased ambulation and particularly their long walks.  She denies any overlying skin changes.    Subjective     The following portions of the patient's history were reviewed and updated as appropriate: allergies, current medications, past family history, past medical history, past social history, past surgical history and problem list.    No Known Allergies    Review of Systems  1. Constitutional: Negative for fever. Negative for chills, diaphoresis, fatigue and unexpected weight change.   2. HENT: No dysphagia; no changes to vision/hearing/smell/taste; no epistaxis  3. Eyes: Negative for redness and visual disturbance.   4. Respiratory: negative for shortness of breath. Negative for chest pain . Negative for cough and chest tightness.   5. Cardiovascular: Negative for chest pain and palpitations.   6. Gastrointestinal:  "Negative for abdominal distention, abdominal pain and blood in stool.   7. Endocrine: Negative for cold intolerance and heat intolerance.   8. Genitourinary: Negative for difficulty urinating, dysuria and frequency.   9. Musculoskeletal: Negative other than that listed above.  10. Skin: Negative for color change, rash and wound.   11. Neurological: Negative for syncope, weakness and headaches.   12. Hematological: Negative for adenopathy. Does not bruise/bleed easily.   13. Psychiatric/Behavioral: Negative for confusion. The patient is not nervous/anxious.    Objective     Physical Exam   Vital Signs: /74 (BP Location: Right arm, Patient Position: Sitting, Cuff Size: Adult)   Pulse 61   Temp 97.3 °F (36.3 °C)   Ht 165.1 cm (65\")   Wt 64 kg (141 lb 3.2 oz)   SpO2 99%   Breastfeeding No   BMI 23.50 kg/m²     General Appearance: alert, oriented x 3, no acute distress.  Skin: warm and dry.   HEENT: Atraumatic.  pupils round and reactive to light and accommodation, oral mucosa pink and moist.  Nares patent without epistaxis.  External auditory canals are patent tympanic membranes intact.  Neck: supple, no JVD, trachea midline.  No thyromegaly  Lungs: CTA, unlabored breathing effort.  Heart: RRR, normal S1 and S2, no S3, no rub.  Abdomen: soft, non-tender, no palpable bladder, present bowel sounds to auscultation ×4.  No guarding or rigidity.  Extremities: no clubbing, cyanosis or edema.  Good range of motion actively and passively.  Symmetric muscle strength and development.  Ambulates independently.  No leg length discrepancies.  Normal dorsiflexion/plantar flexion of bilateral feet.  Quadriceps mechanism intact.  Logroll negative.  Daniel sign weakly positive to the left.  Neuro: normal speech and mental status.  Cranial nerves II through XII intact.  No anosmia. DTR 2+; proprioception intact.  No focal motor/sensory deficits.    Assessment and Plan      Assessment:   Diagnoses and all orders for this " visit:    1. Acquired hypothyroidism (Primary)  -     TSH  -     T4, Free    2. Dyslipidemia    3. Vitamin B12 deficiency    4. It band syndrome, left        Plan:  I have advised patient to utilize additional variance to her daily exercise program, including changes to allow for avoidance of prolonged straight line walking activity.  She is hopeful to return to her usual dance activities with her  in the very near future.  I have discussed stretching exercises with her additionally.  She will utilize a neoprene ankle sleeve to utilize during exercise activity additionally, hopeful this will improve her generalized gait and lessen the abnormality to her IT band.    Surveillance thyroid function today, dose adjustment of supplementation if indicated.    Continue low-cholesterol dietary intake, continue statin therapy.    Continue vitamin supplementation.    She will need other surveillance labs at Medicare wellness visit in 6 months.  Discussion Summary:  I spent 30 minutes caring for Faith on this date of service. This time includes time spent by me in the following activities:preparing for the visit, performing a medically appropriate examination and/or evaluation , counseling and educating the patient/family/caregiver, ordering medications, tests, or procedures, documenting information in the medical record and care coordination     Discussed plan of care in detail with pt today; pt verb understanding and agrees.  Follow up:  Return in about 6 months (around 7/21/2021) for Medicare Wellness, Subsequent.     There are no Patient Instructions on file for this visit.    Cristiano Cat,   01/21/21  14:24 EST          Please note that portions of this note may have been completed with a voice recognition program. Efforts were made to edit the dictations, but occasionally words are mistranscribed.

## 2021-01-22 LAB
T4 FREE SERPL-MCNC: 1.49 NG/DL (ref 0.93–1.7)
TSH SERPL DL<=0.005 MIU/L-ACNC: 2.73 UIU/ML (ref 0.27–4.2)

## 2021-05-27 RX ORDER — LEVOTHYROXINE SODIUM 0.05 MG/1
50 TABLET ORAL DAILY
Qty: 90 TABLET | Refills: 1 | Status: SHIPPED | OUTPATIENT
Start: 2021-05-27 | End: 2021-11-11

## 2021-07-23 ENCOUNTER — OFFICE VISIT (OUTPATIENT)
Dept: FAMILY MEDICINE CLINIC | Facility: CLINIC | Age: 76
End: 2021-07-23

## 2021-07-23 VITALS
WEIGHT: 137.6 LBS | BODY MASS INDEX: 22.92 KG/M2 | DIASTOLIC BLOOD PRESSURE: 72 MMHG | SYSTOLIC BLOOD PRESSURE: 124 MMHG | OXYGEN SATURATION: 98 % | HEIGHT: 65 IN | HEART RATE: 58 BPM | RESPIRATION RATE: 20 BRPM

## 2021-07-23 DIAGNOSIS — Z12.31 ENCOUNTER FOR SCREENING MAMMOGRAM FOR MALIGNANT NEOPLASM OF BREAST: ICD-10-CM

## 2021-07-23 DIAGNOSIS — E78.5 DYSLIPIDEMIA: ICD-10-CM

## 2021-07-23 DIAGNOSIS — Z00.00 ROUTINE GENERAL MEDICAL EXAMINATION AT HEALTH CARE FACILITY: Primary | ICD-10-CM

## 2021-07-23 DIAGNOSIS — E03.9 ACQUIRED HYPOTHYROIDISM: ICD-10-CM

## 2021-07-23 PROCEDURE — 99213 OFFICE O/P EST LOW 20 MIN: CPT | Performed by: FAMILY MEDICINE

## 2021-07-23 PROCEDURE — G0439 PPPS, SUBSEQ VISIT: HCPCS | Performed by: FAMILY MEDICINE

## 2021-07-23 NOTE — PROGRESS NOTES
The ABCs of the Annual Wellness Visit  Subsequent Medicare Wellness Visit    Chief Complaint   Patient presents with   • Medicare Wellness-subsequent       Subjective   History of Present Illness:  Faith Cisneros is a 76 y.o. female who presents for a Subsequent Medicare Wellness Visit.    Also here in scheduled follow-up of her dyslipidemia and hypothyroidism.  She denies any chest pain, syncope, vertigo or palpitations.  Maintains an active lifestyle, she and her  walk approximately 3 to 4 miles daily.  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    No reports of any fever, chills or night sweats.  Maintains his dietary intake and appropriate hydration.  Good urine output, no hematuria or dysuria.    HEALTH RISK ASSESSMENT    Recent Hospitalizations:  No hospitalization(s) within the last year.    Current Medical Providers:  Patient Care Team:  Cristiano Cat DO as PCP - General (Family Medicine)    Smoking Status:  Social History     Tobacco Use   Smoking Status Never Smoker   Smokeless Tobacco Never Used       Alcohol Consumption:  Social History     Substance and Sexual Activity   Alcohol Use No       Depression Screen:   PHQ-2/PHQ-9 Depression Screening 7/23/2021   Little interest or pleasure in doing things 0   Feeling down, depressed, or hopeless 0   Total Score 0       Fall Risk Screen:  STEADI Fall Risk Assessment was completed, and patient is at LOW risk for falls.Assessment completed on:7/23/2021    Health Habits and Functional and Cognitive Screening:  Functional & Cognitive Status 7/23/2021   Do you have difficulty preparing food and eating? No   Do you have difficulty bathing yourself, getting dressed or grooming yourself? No   Do you have difficulty using the toilet? No   Do you have difficulty moving around from place to place? No   Do you have trouble with steps or getting out of a bed or a chair? No   Current Diet Well Balanced Diet   Dental Exam Up to  date   Eye Exam Up to date        Eye Exam Comment -   Exercise (times per week) 7 times per week   Current Exercises Include Walking   Current Exercise Activities Include -   Do you need help using the phone?  No   Are you deaf or do you have serious difficulty hearing?  No   Do you need help with transportation? No   Do you need help shopping? No   Do you need help preparing meals?  No   Do you need help with housework?  No   Do you need help with laundry? No   Do you need help taking your medications? No   Do you need help managing money? No   Do you ever drive or ride in a car without wearing a seat belt? No   Have you felt unusual stress, anger or loneliness in the last month? -   Who do you live with? -   If you need help, do you have trouble finding someone available to you? -   Have you been bothered in the last four weeks by sexual problems? -   Do you have difficulty concentrating, remembering or making decisions? -         Does the patient have evidence of cognitive impairment? No    Asprin use counseling:Start ASA 81 mg daily     Age-appropriate Screening Schedule:  Refer to the list below for future screening recommendations based on patient's age, sex and/or medical conditions. Orders for these recommended tests are listed in the plan section. The patient has been provided with a written plan.    Health Maintenance   Topic Date Due   • DXA SCAN  01/08/2018   • LIPID PANEL  Never done   • MAMMOGRAM  03/07/2021   • INFLUENZA VACCINE  10/01/2021   • TDAP/TD VACCINES (3 - Td or Tdap) 01/08/2029   • ZOSTER VACCINE  Completed          The following portions of the patient's history were reviewed and updated as appropriate: allergies, current medications, past family history, past medical history, past social history, past surgical history and problem list.    Outpatient Medications Prior to Visit   Medication Sig Dispense Refill   • atorvastatin (LIPITOR) 10 MG tablet Take 1 tablet by mouth Daily. 90 tablet  3   • BIOTIN PO Take  by mouth.     • Cholecalciferol (VITAMIN D3 PO) Take 25 mcg by mouth.     • levothyroxine (SYNTHROID, LEVOTHROID) 50 MCG tablet Take 1 tablet by mouth Daily. 90 tablet 1   • Omega-3 Fatty Acids (FISH OIL) 1000 MG capsule capsule Take  by mouth Daily With Breakfast.     • Probiotic Product (PROBIOTIC DAILY PO) Take  by mouth.     • propranolol LA (INDERAL LA) 80 MG 24 hr capsule Take 1 capsule by mouth Daily. 90 capsule 3   • Psyllium (METAMUCIL FIBER PO) Take  by mouth.     • vitamin B-12 (CYANOCOBALAMIN) 1000 MCG tablet Take 1,000 mcg by mouth Daily.       No facility-administered medications prior to visit.       Patient Active Problem List   Diagnosis   • Acquired hypothyroidism   • BMI 22.0-22.9, adult   • Dyslipidemia   • Vitamin B12 deficiency       Advanced Care Planning:  ACP discussion was held with the patient during this visit. Patient has an advance directive in EMR which is still valid.     Review of Systems  1. Constitutional: Negative for fever. Negative for chills, diaphoresis, fatigue and unexpected weight change.   2. HENT: No dysphagia; no changes to vision/hearing/smell/taste; no epistaxis  3. Eyes: Negative for redness and visual disturbance.   4. Respiratory: negative for shortness of breath. Negative for chest pain . Negative for cough and chest tightness.   5. Cardiovascular: Negative for chest pain and palpitations.   6. Gastrointestinal: Negative for abdominal distention, abdominal pain and blood in stool.   7. Endocrine: Negative for cold intolerance and heat intolerance.   8. Genitourinary: Negative for difficulty urinating, dysuria and frequency.   9. Musculoskeletal: Negative other than that listed above.  10. Skin: Negative for color change, rash and wound.   11. Neurological: Negative for syncope, weakness and headaches.   12. Hematological: Negative for adenopathy. Does not bruise/bleed easily.   13. Psychiatric/Behavioral: Negative for confusion. The patient is  "not nervous/anxious.    Compared to one year ago, the patient feels her physical health is better.  Compared to one year ago, the patient feels her mental health is better.    Reviewed chart for potential of high risk medication in the elderly: not applicable  Reviewed chart for potential of harmful drug interactions in the elderly:not applicable    Objective         Vitals:    07/23/21 0808   BP: 124/72   Pulse: 58   Resp: 20   SpO2: 98%   Weight: 62.4 kg (137 lb 9.6 oz)   Height: 165.1 cm (65\")       Body mass index is 22.9 kg/m².  Discussed the patient's BMI with her. The BMI is in the acceptable range.    Physical Exam  General Appearance: alert, oriented x 3, no acute distress.  Skin: warm and dry.   HEENT: Atraumatic.  pupils round and reactive to light and accommodation, oral mucosa pink and moist.  Nares patent without epistaxis.  External auditory canals are patent tympanic membranes intact.  Neck: supple, no JVD, trachea midline.  No thyromegaly  Lungs: CTA, unlabored breathing effort.  Heart: RRR, normal S1 and S2, no S3, no rub.  Abdomen: soft, non-tender, no palpable bladder, present bowel sounds to auscultation ×4.  No guarding or rigidity.  Extremities: no clubbing, cyanosis or edema.  Good range of motion actively and passively.  Symmetric muscle strength and development.  Ambulates independently.  No leg length discrepancies.  Normal dorsiflexion/plantar flexion of bilateral feet.  Quadriceps mechanism intact.  Logroll negative.  Daniel sign weakly positive to the left.  Neuro: normal speech and mental status.  Cranial nerves II through XII intact.  No anosmia. DTR 2+; proprioception intact.  No focal motor/sensory deficits.          Assessment/Plan   Medicare Risks and Personalized Health Plan  CMS Preventative Services Quick Reference  Advance Directive Discussion  Breast Cancer/Mammogram Screening  Cardiovascular risk  Lung Cancer Risk    The above risks/problems have been discussed with the " patient.  Pertinent information has been shared with the patient in the After Visit Summary.  Follow up plans and orders are seen below in the Assessment/Plan Section.    Diagnoses and all orders for this visit:    1. Routine general medical examination at health care facility (Primary)  -     Comprehensive Metabolic Panel  -     CBC & Differential    2. Encounter for screening mammogram for malignant neoplasm of breast  -     Mammo Screening Digital Tomosynthesis Bilateral With CAD; Future    3. Acquired hypothyroidism  -     TSH  -     T4, Free    4. Dyslipidemia  -     TSH  -     T4, Free  -     Comprehensive Metabolic Panel      Follow Up:  No follow-ups on file.     An After Visit Summary and PPPS were given to the patient.       I spent 45 minutes caring for Faith on this date of service; this includes 25 minutes spent in direct management of her Medicare wellness issues/concerns, as well as 20 minutes in care of her chronic hypothyroidism/dyslipidemia management. This time includes time spent by me in the following activities:preparing for the visit, performing a medically appropriate examination and/or evaluation , counseling and educating the patient/family/caregiver, ordering medications, tests, or procedures, documenting information in the medical record and care coordination    I have reviewed and updated all copied forward information, as appropriate.  I attest to the accuracy and relevance of any unchanged information.    I have discussed age/gender specific preventative healthcare issues in detail with patient today.  I have answered all of the questions.    Patient is not sure if she will complete her last recommended mammogram.  An order has been placed should she elect to do so.    Thyroid surveillance labs today, dose adjustment if indicated.    Surveillance CMP, continue statin therapy.  Continue low-cholesterol dietary intake.    6-month follow-up, 1 year for repeat Medicare wellness visit.

## 2021-07-23 NOTE — PATIENT INSTRUCTIONS
Breast Self-Awareness  Breast self-awareness means being familiar with how your breasts look and feel. It involves checking your breasts regularly and reporting any changes to your health care provider.  Practicing breast self-awareness is important. Sometimes changes may not be harmful (are benign), but sometimes a change in your breasts can be a sign of a serious medical problem. It is important to learn how to do this procedure correctly so that you can catch problems early, when treatment is more likely to be successful. All women should practice breast self-awareness, including women who have had breast implants.  What you need:  · A mirror.  · A well-lit room.  How to do a breast self-exam  A breast self-exam is one way to learn what is normal for your breasts and whether your breasts are changing. To do a breast self-exam:  Look for changes    1. Remove all the clothing above your waist.  2.  front of a mirror in a room with good lighting.  3. Put your hands on your hips.  4. Push your hands firmly downward.  5. Compare your breasts in the mirror. Look for differences between them (asymmetry), such as:  ? Differences in shape.  ? Differences in size.  ? Puckers, dips, and bumps in one breast and not the other.  6. Look at each breast for changes in the skin, such as:  ? Redness.  ? Scaly areas.  7. Look for changes in your nipples, such as:  ? Discharge.  ? Bleeding.  ? Dimpling.  ? Redness.  ? A change in position.  Feel for changes  Carefully feel your breasts for lumps and changes. It is best to do this while lying on your back on the floor, and again while sitting or standing in the tub or shower with soapy water on your skin. Feel each breast in the following way:  1. Place the arm on the side of the breast you are examining above your head.  2. Feel your breast with the other hand.  3. Start in the nipple area and make ¾-inch (2 cm) overlapping circles to feel your breast. Use the pads of  your three middle fingers to do this. Apply light pressure, then medium pressure, then firm pressure. The light pressure will allow you to feel the tissue closest to the skin. The medium pressure will allow you to feel the tissue that is a little deeper. The firm pressure will allow you to feel the tissue close to the ribs.  4. Continue the overlapping circles, moving downward over the breast until you feel your ribs below your breast.  5. Move one finger-width toward the center of the body. Continue to use the ¾-inch (2 cm) overlapping circles to feel your breast as you move slowly up toward your collarbone.  6. Continue the up-and-down exam using all three pressures until you reach your armpit.    Write down what you find  Writing down what you find can help you remember what to discuss with your health care provider. Write down:  · What is normal for each breast.  · Any changes that you find in each breast, including:  ? The kind of changes you find.  ? Any pain or tenderness.  ? Size and location of any lumps.  · Where you are in your menstrual cycle, if you are still menstruating.  General tips and recommendations  · Examine your breasts every month.  · If you are breastfeeding, the best time to examine your breasts is after a feeding or after using a breast pump.  · If you menstruate, the best time to examine your breasts is 5-7 days after your period. Breasts are generally lumpier during menstrual periods, and it may be more difficult to notice changes.  · With time and practice, you will become more familiar with the variations in your breasts and more comfortable with the exam.  Contact a health care provider if you:  · See a change in the shape or size of your breasts or nipples.  · See a change in the skin of your breast or nipples, such as a reddened or scaly area.  · Have unusual discharge from your nipples.  · Find a lump or thick area that was not there before.  · Have pain in your breasts.  · Have  any concerns related to your breast health.  Summary  · Breast self-awareness includes looking for physical changes in your breasts, as well as feeling for any changes within your breasts.  · Breast self-awareness should be performed in front of a mirror in a well-lit room.  · You should examine your breasts every month. If you menstruate, the best time to examine your breasts is 5-7 days after your menstrual period.  · Let your health care provider know of any changes you notice in your breasts, including changes in size, changes on the skin, pain or tenderness, or unusual fluid from your nipples.  This information is not intended to replace advice given to you by your health care provider. Make sure you discuss any questions you have with your health care provider.  Document Revised: 08/06/2019 Document Reviewed: 08/06/2019  [employer] Patient Education © 2021 [employer]      Breast Self-Awareness  Breast self-awareness is knowing how your breasts look and feel. Doing breast self-awareness is important. It allows you to catch a breast problem early while it is still small and can be treated. All women should do breast self-awareness, including women who have had breast implants. Tell your doctor if you notice a change in your breasts.  What you need:  · A mirror.  · A well-lit room.  How to do a breast self-exam  A breast self-exam is one way to learn what is normal for your breasts and to check for changes. To do a breast self-exam:  Look for changes    1. Take off all the clothes above your waist.  2.  front of a mirror in a room with good lighting.  3. Put your hands on your hips.  4. Push your hands down.  5. Look at your breasts and nipples in the mirror to see if one breast or nipple looks different from the other. Check to see if:  ? The shape of one breast is different.  ? The size of one breast is different.  ? There are wrinkles, dips, and bumps in one breast and not the other.  6. Look at each  breast for changes in the skin, such as:  ? Redness.  ? Scaly areas.  7. Look for changes in your nipples, such as:  ? Liquid around the nipples.  ? Bleeding.  ? Dimpling.  ? Redness.  ? A change in where the nipples are.  Feel for changes    1. Lie on your back on the floor.  2. Feel each breast. To do this, follow these steps:  ? Pick a breast to feel.  ? Put the arm closest to that breast above your head.  ? Use your other arm to feel the nipple area of your breast. Feel the area with the pads of your three middle fingers by making small circles with your fingers. For the first [place], press lightly. For the second [place], press harder. For the third [place], press even harder.  ? Keep making circles with your fingers at the different pressures as you move down your breast. Stop when you feel your ribs.  ? Move your fingers a little toward the center of your body.  ? Start making circles with your fingers again, this time going up until you reach your collarbone.  ? Keep making up-and-down circles until you reach your armpit. Remember to keep using the three pressures.  ? Feel the other breast in the same way.  3. Sit or  the tub or shower.  4. With soapy water on your skin, feel each breast the same way you did in step 2 when you were lying on the floor.  Write down what you find  Writing down what you find can help you remember what to tell your doctor. Write down:  · What is normal for each breast.  · Any changes you find in each breast, including:  ? The kind of changes you find.  ? Whether you have pain.  ? Size and location of any lumps.  · When you last had your menstrual period.  General tips  · Check your breasts every month.  · If you are breastfeeding, the best time to check your breasts is after you feed your baby or after you use a breast pump.  · If you get menstrual periods, the best time to check your breasts is 5-7 days after your menstrual period is over.  · With time, you will become  comfortable with the self-exam, and you will begin to know if there are changes in your breasts.  Contact a doctor if you:  · See a change in the shape or size of your breasts or nipples.  · See a change in the skin of your breast or nipples, such as red or scaly skin.  · Have fluid coming from your nipples that is not normal.  · Find a lump or thick area that was not there before.  · Have pain in your breasts.  · Have any concerns about your breast health.  Summary  · Breast self-awareness includes looking for changes in your breasts, as well as feeling for changes within your breasts.  · Breast self-awareness should be done in front of a mirror in a well-lit room.  · You should check your breasts every month. If you get menstrual periods, the best time to check your breasts is 5-7 days after your menstrual period is over.  · Let your doctor know of any changes you see in your breasts, including changes in size, changes on the skin, pain or tenderness, or fluid from your nipples that is not normal.  This information is not intended to replace advice given to you by your health care provider. Make sure you discuss any questions you have with your health care provider.  Document Revised: 08/06/2019 Document Reviewed: 08/06/2019  [employer] Patient Education © 2021 [employer]      Exercising to Stay Healthy  To become healthy and stay healthy, it is recommended that you do moderate-intensity and vigorous-intensity exercise. You can tell that you are exercising at a moderate intensity if your heart starts beating faster and you start breathing faster but can still hold a conversation. You can tell that you are exercising at a vigorous intensity if you are breathing much harder and faster and cannot hold a conversation while exercising.  Exercising regularly is important. It has many health benefits, such as:  · Improving overall fitness, flexibility, and endurance.  · Increasing bone density.  · Helping with weight  control.  · Decreasing body fat.  · Increasing muscle strength.  · Reducing stress and tension.  · Improving overall health.  How often should I exercise?  Choose an activity that you enjoy, and set realistic goals. Your health care provider can help you make an activity plan that works for you.  Exercise regularly as told by your health care provider. This may include:  · Doing strength training two times a week, such as:  ? Lifting weights.  ? Using resistance bands.  ? Push-ups.  ? Sit-ups.  ? Yoga.  · Doing a certain intensity of exercise for a given amount of time. Choose from these options:  ? A total of 150 minutes of moderate-intensity exercise every week.  ? A total of 75 minutes of vigorous-intensity exercise every week.  ? A mix of moderate-intensity and vigorous-intensity exercise every week.  Children, pregnant women, people who have not exercised regularly, people who are overweight, and older adults may need to talk with a health care provider about what activities are safe to do. If you have a medical condition, be sure to talk with your health care provider before you start a new exercise program.  What are some exercise ideas?  Moderate-intensity exercise ideas include:  · Walking 1 mile (1.6 km) in about 15 minutes.  · Biking.  · Hiking.  · Golfing.  · Dancing.  · Water aerobics.  Vigorous-intensity exercise ideas include:  · Walking 4.5 miles (7.2 km) or more in about 1 hour.  · Jogging or running 5 miles (8 km) in about 1 hour.  · Biking 10 miles (16.1 km) or more in about 1 hour.  · Lap swimming.  · Roller-skating or in-line skating.  · Cross-country skiing.  · Vigorous competitive sports, such as football, basketball, and soccer.  · Jumping rope.  · Aerobic dancing.  What are some everyday activities that can help me to get exercise?  · Yard work, such as:  ? Pushing a .  ? Raking and bagging leaves.  · Washing your car.  · Pushing a stroller.  · Shoveling  snow.  · Gardening.  · Washing windows or floors.  How can I be more active in my day-to-day activities?  · Use stairs instead of an elevator.  · Take a walk during your lunch break.  · If you drive, park your car farther away from your work or school.  · If you take public transportation, get off one stop early and walk the rest of the way.  · Stand up or walk around during all of your indoor phone calls.  · Get up, stretch, and walk around every 30 minutes throughout the day.  · Enjoy exercise with a friend. Support to continue exercising will help you keep a regular routine of activity.  What guidelines can I follow while exercising?  · Before you start a new exercise program, talk with your health care provider.  · Do not exercise so much that you hurt yourself, feel dizzy, or get very short of breath.  · Wear comfortable clothes and wear shoes with good support.  · Drink plenty of water while you exercise to prevent dehydration or heat stroke.  · Work out until your breathing and your heartbeat get faster.  Where to find more information  · [place] Department of Health and Human Services: www.hhs.gov  · Centers for Disease Control and Prevention (CDC): www.cdc.gov  Summary  · Exercising regularly is important. It will improve your overall fitness, flexibility, and endurance.  · Regular exercise also will improve your overall health. It can help you control your weight, reduce stress, and improve your bone density.  · Do not exercise so much that you hurt yourself, feel dizzy, or get very short of breath.  · Before you start a new exercise program, talk with your health care provider.  This information is not intended to replace advice given to you by your health care provider. Make sure you discuss any questions you have with your health care provider.  Document Revised: 11/30/2018 Document Reviewed: 11/08/2018  Elsevier Patient Education © 2021 [employer]      Fall Prevention in the Home, Adult  Falls can  cause injuries and can affect people from all age groups. There are many simple things that you can do to make your home safe and to help prevent falls. Ask for help when making these changes, if needed.  What actions can I take to prevent falls?  General instructions  · Use good lighting in all rooms. Replace any light bulbs that burn out.  · Turn on lights if it is dark. Use night-lights.  · Place frequently used items in easy-to-reach places. Lower the shelves around your home if necessary.  · Set up furniture so that there are clear paths around it. Avoid moving your furniture around.  · Remove throw rugs and other tripping hazards from the floor.  · Avoid walking on wet floors.  · Fix any uneven floor surfaces.  · Add color or contrast paint or tape to grab bars and handrails in your home. Place contrasting color strips on the first and last steps of stairways.  · When you use a stepladder, make sure that it is completely opened and that the sides are firmly locked. Have someone hold the ladder while you are using it. Do not climb a closed stepladder.  · Be aware of any and all pets.  What can I do in the bathroom?         · Keep the floor dry. Immediately clean up any water that spills onto the floor.  · Remove soap buildup in the tub or shower on a regular basis.  · Use non-skid mats or decals on the floor of the tub or shower.  · Attach bath mats securely with double-sided, non-slip rug tape.  · If you need to sit down while you are in the shower, use a plastic, non-slip stool.  · Install grab bars by the toilet and in the tub and shower. Do not use towel bars as grab bars.  What can I do in the bedroom?  · Make sure that a bedside light is easy to reach.  · Do not use oversized bedding that drapes onto the floor.  · Have a firm chair that has side arms to use for getting dressed.  What can I do in the kitchen?  · Clean up any spills right away.  · If you need to reach for something above you, use a sturdy  step stool that has a grab bar.  · Keep electrical cables out of the way.  · Do not use floor polish or wax that makes floors slippery. If you must use wax, make sure that it is non-skid floor wax.  What can I do in the stairways?  · Do not leave any items on the stairs.  · Make sure that you have a light switch at the top of the stairs and the bottom of the stairs. Have them installed if you do not have them.  · Make sure that there are handrails on both sides of the stairs. Fix handrails that are broken or loose. Make sure that handrails are as long as the stairways.  · Install non-slip stair treads on all stairs in your home.  · Avoid having throw rugs at the top or bottom of stairways, or secure the rugs with carpet tape to prevent them from moving.  · Choose a carpet design that does not hide the edge of steps on the stairway.  · Check any carpeting to make sure that it is firmly attached to the stairs. Fix any carpet that is loose or worn.  What can I do on the outside of my home?  · Use bright outdoor lighting.  · Regularly repair the edges of walkways and driveways and fix any cracks.  · Remove high doorway thresholds.  · Trim any shrubbery on the main path into your home.  · Regularly check that handrails are securely fastened and in good repair. Both sides of any steps should have handrails.  · Install guardrails along the edges of any raised decks or porches.  · Clear walkways of debris and clutter, including tools and rocks.  · Have leaves, snow, and ice cleared regularly.  · Use sand or salt on walkways during winter months.  · In the garage, clean up any spills right away, including grease or oil spills.  What other actions can I take?  · Wear closed-toe shoes that fit well and support your feet. Wear shoes that have rubber soles or low heels.  · Use mobility aids as needed, such as canes, walkers, scooters, and crutches.  · Review your medicines with your health care provider. Some medicines can cause  dizziness or changes in blood pressure, which increase your risk of falling.  Talk with your health care provider about other ways that you can decrease your risk of falls. This may include working with a physical therapist or  to improve your strength, balance, and endurance.  Where to find more information  · Centers for Disease Control and Prevention, [place]: https://www.cdc.gov  · National [place] on Aging: https://bk8iwel.michelle.nih.gov  Contact a health care provider if:  · You are afraid of falling at home.  · You feel weak, drowsy, or dizzy at home.  · You fall at home.  Summary  · There are many simple things that you can do to make your home safe and to help prevent falls.  · Ways to make your home safe include removing tripping hazards and installing grab bars in the bathroom.  · Ask for help when making these changes in your home.  This information is not intended to replace advice given to you by your health care provider. Make sure you discuss any questions you have with your health care provider.  Document Revised: 11/30/2018 Document Reviewed: 08/02/2018  [employer] Patient Education © 2021 [employer]      Fall Prevention in the Home, Adult  Falls can cause injuries. They can happen to people of all ages. There are many things you can do to make your home safe and to help prevent falls. Ask for help when making these changes, if needed.  What actions can I take to prevent falls?  General Instructions  · Use good lighting in all rooms. Replace any light bulbs that burn out.  · Turn on the lights when you go into a dark area. Use night-lights.  · Keep items that you use often in easy-to-reach places. Lower the shelves around your home if necessary.  · Set up your furniture so you have a clear path. Avoid moving your furniture around.  · Do not have throw rugs and other things on the floor that can make you trip.  · Avoid walking on wet floors.  · If any of your floors are uneven, fix  them.  · Add color or contrast paint or tape to clearly marshal and help you see:  ? Any grab bars or handrails.  ? First and last steps of stairways.  ? Where the edge of each step is.  · If you use a stepladder:  ? Make sure that it is fully opened. Do not climb a closed stepladder.  ? Make sure that both sides of the stepladder are locked into place.  ? Ask someone to hold the stepladder for you while you use it.  · If there are any pets around you, be aware of where they are.  What can I do in the bathroom?         · Keep the floor dry. Clean up any water that spills onto the floor as soon as it happens.  · Remove soap buildup in the tub or shower regularly.  · Use non-skid mats or decals on the floor of the tub or shower.  · Attach bath mats securely with double-sided, non-slip rug tape.  · If you need to sit down in the shower, use a plastic, non-slip stool.  · Install grab bars by the toilet and in the tub and shower. Do not use towel bars as grab bars.  What can I do in the bedroom?  · Make sure that you have a light by your bed that is easy to reach.  · Do not use any sheets or blankets that are too big for your bed. They should not hang down onto the floor.  · Have a firm chair that has side arms. You can use this for support while you get dressed.  What can I do in the kitchen?  · Clean up any spills right away.  · If you need to reach something above you, use a strong step stool that has a grab bar.  · Keep electrical cords out of the way.  · Do not use floor polish or wax that makes floors slippery. If you must use wax, use non-skid floor wax.  What can I do with my stairs?  · Do not leave any items on the stairs.  · Make sure that you have a light switch at the top of the stairs and the bottom of the stairs. If you do not have them, ask someone to add them for you.  · Make sure that there are handrails on both sides of the stairs, and use them. Fix handrails that are broken or loose. Make sure that  handrails are as long as the stairways.  · Install non-slip stair treads on all stairs in your home.  · Avoid having throw rugs at the top or bottom of the stairs. If you do have throw rugs, attach them to the floor with carpet tape.  · Choose a carpet that does not hide the edge of the steps on the stairway.  · Check any carpeting to make sure that it is firmly attached to the stairs. Fix any carpet that is loose or worn.  What can I do on the outside of my home?  · Use bright outdoor lighting.  · Regularly fix the edges of walkways and driveways and fix any cracks.  · Remove anything that might make you trip as you walk through a door, such as a raised step or threshold.  · Trim any bushes or trees on the path to your home.  · Regularly check to see if handrails are loose or broken. Make sure that both sides of any steps have handrails.  · Install guardrails along the edges of any raised decks and porches.  · Clear walking paths of anything that might make someone trip, such as tools or rocks.  · Have any leaves, snow, or ice cleared regularly.  · Use sand or salt on walking paths during winter.  · Clean up any spills in your garage right away. This includes grease or oil spills.  What other actions can I take?  · Wear shoes that:  ? Have a low heel. Do not wear high heels.  ? Have rubber bottoms.  ? Are comfortable and fit you well.  ? Are closed at the toe. Do not wear open-toe sandals.  · Use tools that help you move around (mobility aids) if they are needed. These include:  ? Canes.  ? Walkers.  ? Scooters.  ? Crutches.  · Review your medicines with your doctor. Some medicines can make you feel dizzy. This can increase your chance of falling.  Ask your doctor what other things you can do to help prevent falls.  Where to find more information  · Centers for Disease Control and Prevention, [place]: https://cdc.gov  · National [place] on Aging: https://ab8nenn.michelle.nih.gov  Contact a doctor if:  · You are afraid  of falling at home.  · You feel weak, drowsy, or dizzy at home.  · You fall at home.  Summary  · There are many simple things that you can do to make your home safe and to help prevent falls.  · Ways to make your home safe include removing tripping hazards and installing grab bars in the bathroom.  · Ask for help when making these changes in your home.  This information is not intended to replace advice given to you by your health care provider. Make sure you discuss any questions you have with your health care provider.  Document Revised: 04/09/2020 Document Reviewed: 08/02/2018  [employer] Patient Education © 2021 [employer] [employer]

## 2021-07-24 LAB
ALBUMIN SERPL-MCNC: 4.2 G/DL (ref 3.5–5.2)
ALBUMIN/GLOB SERPL: 2.3 G/DL
ALP SERPL-CCNC: 90 U/L (ref 39–117)
ALT SERPL-CCNC: 8 U/L (ref 1–33)
AST SERPL-CCNC: 15 U/L (ref 1–32)
BASOPHILS # BLD AUTO: 0.03 10*3/MM3 (ref 0–0.2)
BASOPHILS NFR BLD AUTO: 0.6 % (ref 0–1.5)
BILIRUB SERPL-MCNC: 0.5 MG/DL (ref 0–1.2)
BUN SERPL-MCNC: 9 MG/DL (ref 8–23)
BUN/CREAT SERPL: 11.1 (ref 7–25)
CALCIUM SERPL-MCNC: 9.1 MG/DL (ref 8.6–10.5)
CHLORIDE SERPL-SCNC: 102 MMOL/L (ref 98–107)
CO2 SERPL-SCNC: 24.9 MMOL/L (ref 22–29)
CREAT SERPL-MCNC: 0.81 MG/DL (ref 0.57–1)
EOSINOPHIL # BLD AUTO: 0.28 10*3/MM3 (ref 0–0.4)
EOSINOPHIL NFR BLD AUTO: 5.4 % (ref 0.3–6.2)
ERYTHROCYTE [DISTWIDTH] IN BLOOD BY AUTOMATED COUNT: 11.8 % (ref 12.3–15.4)
GLOBULIN SER CALC-MCNC: 1.8 GM/DL
GLUCOSE SERPL-MCNC: 86 MG/DL (ref 65–99)
HCT VFR BLD AUTO: 35.4 % (ref 34–46.6)
HGB BLD-MCNC: 11.7 G/DL (ref 12–15.9)
IMM GRANULOCYTES # BLD AUTO: 0.01 10*3/MM3 (ref 0–0.05)
IMM GRANULOCYTES NFR BLD AUTO: 0.2 % (ref 0–0.5)
LYMPHOCYTES # BLD AUTO: 1.13 10*3/MM3 (ref 0.7–3.1)
LYMPHOCYTES NFR BLD AUTO: 21.9 % (ref 19.6–45.3)
MCH RBC QN AUTO: 31.5 PG (ref 26.6–33)
MCHC RBC AUTO-ENTMCNC: 33.1 G/DL (ref 31.5–35.7)
MCV RBC AUTO: 95.2 FL (ref 79–97)
MONOCYTES # BLD AUTO: 0.39 10*3/MM3 (ref 0.1–0.9)
MONOCYTES NFR BLD AUTO: 7.6 % (ref 5–12)
NEUTROPHILS # BLD AUTO: 3.32 10*3/MM3 (ref 1.7–7)
NEUTROPHILS NFR BLD AUTO: 64.3 % (ref 42.7–76)
NRBC BLD AUTO-RTO: 0.2 /100 WBC (ref 0–0.2)
PLATELET # BLD AUTO: 276 10*3/MM3 (ref 140–450)
POTASSIUM SERPL-SCNC: 4.1 MMOL/L (ref 3.5–5.2)
PROT SERPL-MCNC: 6 G/DL (ref 6–8.5)
RBC # BLD AUTO: 3.72 10*6/MM3 (ref 3.77–5.28)
SODIUM SERPL-SCNC: 137 MMOL/L (ref 136–145)
T4 FREE SERPL-MCNC: 1.51 NG/DL (ref 0.93–1.7)
TSH SERPL DL<=0.005 MIU/L-ACNC: 1.86 UIU/ML (ref 0.27–4.2)
WBC # BLD AUTO: 5.16 10*3/MM3 (ref 3.4–10.8)

## 2021-11-11 RX ORDER — PROPRANOLOL HYDROCHLORIDE 80 MG/1
CAPSULE, EXTENDED RELEASE ORAL
Qty: 90 CAPSULE | Refills: 3 | Status: SHIPPED | OUTPATIENT
Start: 2021-11-11 | End: 2022-11-07

## 2021-11-11 RX ORDER — LEVOTHYROXINE SODIUM 0.05 MG/1
TABLET ORAL
Qty: 90 TABLET | Refills: 3 | Status: SHIPPED | OUTPATIENT
Start: 2021-11-11 | End: 2022-11-07

## 2021-11-11 RX ORDER — ATORVASTATIN CALCIUM 10 MG/1
TABLET, FILM COATED ORAL
Qty: 90 TABLET | Refills: 3 | Status: SHIPPED | OUTPATIENT
Start: 2021-11-11 | End: 2022-11-07

## 2022-01-24 ENCOUNTER — OFFICE VISIT (OUTPATIENT)
Dept: FAMILY MEDICINE CLINIC | Facility: CLINIC | Age: 77
End: 2022-01-24

## 2022-01-24 VITALS
DIASTOLIC BLOOD PRESSURE: 78 MMHG | OXYGEN SATURATION: 98 % | BODY MASS INDEX: 24.12 KG/M2 | HEART RATE: 70 BPM | HEIGHT: 65 IN | SYSTOLIC BLOOD PRESSURE: 118 MMHG | WEIGHT: 144.8 LBS | RESPIRATION RATE: 16 BRPM | TEMPERATURE: 97.3 F

## 2022-01-24 DIAGNOSIS — E03.9 ACQUIRED HYPOTHYROIDISM: Primary | ICD-10-CM

## 2022-01-24 DIAGNOSIS — E78.5 DYSLIPIDEMIA: ICD-10-CM

## 2022-01-24 PROCEDURE — 99213 OFFICE O/P EST LOW 20 MIN: CPT | Performed by: FAMILY MEDICINE

## 2022-01-24 NOTE — PROGRESS NOTES
Established Patient        Chief Complaint:   Chief Complaint   Patient presents with   • Follow-up        Faith Cisneros is a 76 y.o. female    History of Present Illness:   Here today in scheduled follow-up visit of her hypothyroidism and dyslipidemia.  She denies any problems with her current medication regimen.  She is updated on her flu vaccination, as well has received her third Covid shot/booster.  Denies any problems with either 1.    She maintains an active lifestyle, balanced dietary intake.  She reports good urinary output, no hematuria.  Denies any BRB/BTS.  No chest pain, syncope, palpitations or vertigo.    Subjective     The following portions of the patient's history were reviewed and updated as appropriate: allergies, current medications, past family history, past medical history, past social history, past surgical history and problem list.    No Known Allergies    Review of Systems  1. Constitutional: Negative for fever. Negative for chills, diaphoresis, fatigue and unexpected weight change.   2. HENT: No dysphagia; no changes to vision/hearing/smell/taste; no epistaxis  3. Eyes: Negative for redness and visual disturbance.   4. Respiratory: negative for shortness of breath. Negative for chest pain . Negative for cough and chest tightness.   5. Cardiovascular: Negative for chest pain and palpitations.   6. Gastrointestinal: Negative for abdominal distention, abdominal pain and blood in stool.   7. Endocrine: Negative for cold intolerance and heat intolerance.   8. Genitourinary: Negative for difficulty urinating, dysuria and frequency.   9. Musculoskeletal: Negative other than that listed above.  10. Skin: Negative for color change, rash and wound.   11. Neurological: Negative for syncope, weakness and headaches.   12. Hematological: Negative for adenopathy. Does not bruise/bleed easily.   13. Psychiatric/Behavioral: Negative for confusion. The patient is not nervous/anxious.    Objective  "    Physical Exam   Vital Signs: /78   Pulse 70   Temp 97.3 °F (36.3 °C)   Resp 16   Ht 165.1 cm (65\")   Wt 65.7 kg (144 lb 12.8 oz)   SpO2 98%   BMI 24.10 kg/m²     General Appearance: alert, oriented x 3, no acute distress.  Skin: warm and dry.   HEENT: Atraumatic.  pupils round and reactive to light and accommodation, oral mucosa pink and moist.  Nares patent without epistaxis.  External auditory canals are patent tympanic membranes intact.  Neck: supple, no JVD, trachea midline.  No thyromegaly  Lungs: CTA, unlabored breathing effort.  Heart: RRR, normal S1 and S2, no S3, no rub.  Abdomen: soft, non-tender, no palpable bladder, present bowel sounds to auscultation ×4.  No guarding or rigidity.  Extremities: no clubbing, cyanosis or edema.  Good range of motion actively and passively.  Symmetric muscle strength and development.  Ambulates independently.  No leg length discrepancies.  Normal dorsiflexion/plantar flexion of bilateral feet.  Quadriceps mechanism intact.  Logroll negative.  Daniel sign weakly positive to the left.  Neuro: normal speech and mental status.  Cranial nerves II through XII intact.  No anosmia. DTR 2+; proprioception intact.  No focal motor/sensory deficits.    Assessment and Plan      Assessment:   Diagnoses and all orders for this visit:    1. Acquired hypothyroidism (Primary)  -     TSH  -     T4, Free    2. Dyslipidemia        Plan:  Repeat /78    Updated thyroid function studies today, dose adjustment if indicated.    Continue statin therapy, as well as low-cholesterol dietary intake.    Vital signs demonstrate hemodynamic stability.    Medicare wellness visit at follow-up.    Discussion Summary:    I spent 25 minutes caring for Faith on this date of service. This time includes time spent by me in the following activities:preparing for the visit, performing a medically appropriate examination and/or evaluation , counseling and educating the patient/family/caregiver, " ordering medications, tests, or procedures, documenting information in the medical record and care coordination    I have reviewed and updated all copied forward information, as appropriate.  I attest to the accuracy and relevance of any unchanged information.    Discussed plan of care in detail with pt today; pt verb understanding and agrees.  Follow up:  Return in about 6 months (around 7/24/2022) for Medicare Wellness, subsequent.     There are no Patient Instructions on file for this visit.    Cristiano Cat, DO  01/24/22  08:57 EST          Please note that portions of this note may have been completed with a voice recognition program. Efforts were made to edit the dictations, but occasionally words are mistranscribed.

## 2022-01-25 LAB
T4 FREE SERPL-MCNC: 1.72 NG/DL (ref 0.93–1.7)
TSH SERPL-ACNC: 2.26 UIU/ML (ref 0.27–4.2)

## 2022-02-05 PROCEDURE — U0004 COV-19 TEST NON-CDC HGH THRU: HCPCS | Performed by: PERSONAL EMERGENCY RESPONSE ATTENDANT

## 2022-02-07 ENCOUNTER — TELEPHONE (OUTPATIENT)
Dept: FAMILY MEDICINE CLINIC | Facility: CLINIC | Age: 77
End: 2022-02-07

## 2022-02-07 NOTE — TELEPHONE ENCOUNTER
Caller: Faith Cisneros    Relationship: Self    Best call back number: [phone]    Caller requesting test results:     What test was performed:COVID    When was the test performed: SATURDAY    Where was the test performed: [facility]    Additional notes:   PT STATED THAT RESULTS SHOWED IN HER MYCHART THAT COVID WAS DETECTED AND WOULD LIKE TO KNOW WHAT THAT MEAN.

## 2022-02-07 NOTE — TELEPHONE ENCOUNTER
Caller: Faith Cisneros    Relationship: Self    Best call back number:[phone]    What is the best time to reach you: ANYTIME    Who are you requesting to speak with (clinical staff, provider,  specific staff member): DR. HAAS    What was the call regarding: WOULD LIKE TO KNOW IF DR. HAAS WILL BE CALLING IN A MEDICATION FOR HER COUGH    Do you require a callback: YES

## 2022-02-08 RX ORDER — AZITHROMYCIN 250 MG/1
TABLET, FILM COATED ORAL
Qty: 6 TABLET | Refills: 0 | Status: SHIPPED | OUTPATIENT
Start: 2022-02-08 | End: 2022-07-25

## 2022-02-08 RX ORDER — DEXAMETHASONE 0.5 MG/1
TABLET ORAL
Qty: 21 TABLET | Refills: 0 | Status: SHIPPED | OUTPATIENT
Start: 2022-02-08 | End: 2022-02-18

## 2022-02-18 RX ORDER — DEXAMETHASONE 0.5 MG/1
TABLET ORAL
Qty: 21 TABLET | Refills: 0 | Status: SHIPPED | OUTPATIENT
Start: 2022-02-18 | End: 2022-07-25

## 2022-02-18 RX ORDER — CEFDINIR 300 MG/1
300 CAPSULE ORAL 2 TIMES DAILY
Qty: 14 CAPSULE | Refills: 0 | Status: SHIPPED | OUTPATIENT
Start: 2022-02-18 | End: 2022-07-25

## 2022-07-25 ENCOUNTER — OFFICE VISIT (OUTPATIENT)
Dept: FAMILY MEDICINE CLINIC | Facility: CLINIC | Age: 77
End: 2022-07-25

## 2022-07-25 VITALS
DIASTOLIC BLOOD PRESSURE: 76 MMHG | HEART RATE: 67 BPM | RESPIRATION RATE: 14 BRPM | TEMPERATURE: 97.3 F | OXYGEN SATURATION: 97 % | HEIGHT: 65 IN | WEIGHT: 143.2 LBS | BODY MASS INDEX: 23.86 KG/M2 | SYSTOLIC BLOOD PRESSURE: 138 MMHG

## 2022-07-25 DIAGNOSIS — E03.9 ACQUIRED HYPOTHYROIDISM: ICD-10-CM

## 2022-07-25 DIAGNOSIS — Z13.6 ENCOUNTER FOR SCREENING FOR CARDIOVASCULAR DISORDERS: ICD-10-CM

## 2022-07-25 DIAGNOSIS — Z00.00 MEDICARE ANNUAL WELLNESS VISIT, SUBSEQUENT: Primary | ICD-10-CM

## 2022-07-25 DIAGNOSIS — E78.5 DYSLIPIDEMIA: ICD-10-CM

## 2022-07-25 PROCEDURE — 1159F MED LIST DOCD IN RCRD: CPT | Performed by: FAMILY MEDICINE

## 2022-07-25 PROCEDURE — 1170F FXNL STATUS ASSESSED: CPT | Performed by: FAMILY MEDICINE

## 2022-07-25 PROCEDURE — 1126F AMNT PAIN NOTED NONE PRSNT: CPT | Performed by: FAMILY MEDICINE

## 2022-07-25 PROCEDURE — G0439 PPPS, SUBSEQ VISIT: HCPCS | Performed by: FAMILY MEDICINE

## 2022-07-25 NOTE — PROGRESS NOTES
The ABCs of the Annual Wellness Visit  Subsequent Medicare Wellness Visit    Chief Complaint   Patient presents with   • Medicare Wellness-subsequent      Subjective    History of Present Illness:  Faith Cisneros is a 77 y.o. female who presents for a Subsequent Medicare Wellness Visit.    The following portions of the patient's history were reviewed and   updated as appropriate: allergies, current medications, past family history, past medical history, past social history, past surgical history and problem list.    Compared to one year ago, the patient feels her physical   health is better.    Compared to one year ago, the patient feels her mental   health is better.    Recent Hospitalizations:  She was not admitted to the hospital during the last year.       Current Medical Providers:  Patient Care Team:  Cristiano Cat DO as PCP - General (Family Medicine)    Outpatient Medications Prior to Visit   Medication Sig Dispense Refill   • atorvastatin (LIPITOR) 10 MG tablet TAKE 1 TABLET DAILY 90 tablet 3   • Cholecalciferol (VITAMIN D3 PO) Take 25 mcg by mouth.     • levothyroxine (SYNTHROID, LEVOTHROID) 50 MCG tablet TAKE 1 TABLET DAILY 90 tablet 3   • Omega-3 Fatty Acids (FISH OIL) 1000 MG capsule capsule Take  by mouth Daily With Breakfast.     • Probiotic Product (PROBIOTIC DAILY PO) Take  by mouth.     • propranolol LA (INDERAL LA) 80 MG 24 hr capsule TAKE 1 CAPSULE DAILY 90 capsule 3   • Psyllium (METAMUCIL FIBER PO) Take  by mouth.     • vitamin B-12 (CYANOCOBALAMIN) 1000 MCG tablet Take 1,000 mcg by mouth Daily.     • BIOTIN PO Take  by mouth.     • azithromycin (Zithromax) 250 MG tablet Take 2 tablets the first day, then 1 tablet daily for 4 days. 6 tablet 0   • cefdinir (OMNICEF) 300 MG capsule Take 1 capsule by mouth 2 (Two) Times a Day. 14 capsule 0   • dexamethasone (DECADRON) 0.5 MG tablet 6 po x1d; then 5 po x 1d; then 4 po x 1d; then 3 po x 1d; then 2 po x 1d; then 1 po x1d; then STOP 21  "tablet 0     No facility-administered medications prior to visit.       No opioid medication identified on active medication list. I have reviewed chart for other potential  high risk medication/s and harmful drug interactions in the elderly.          Aspirin is not on active medication list.  Aspirin use is not indicated based on review of current medical condition/s. Risk of harm outweighs potential benefits.  .    Patient Active Problem List   Diagnosis   • Acquired hypothyroidism   • BMI 22.0-22.9, adult   • Dyslipidemia   • Vitamin B12 deficiency     Advance Care Planning  Advance Directive is on file.  ACP discussion was held with the patient during this visit. Patient has an advance directive in EMR which is still valid.           Objective    Vitals:    07/25/22 0933   BP: 138/76   Pulse: 67   Resp: 14   Temp: 97.3 °F (36.3 °C)   SpO2: 97%   Weight: 65 kg (143 lb 3.2 oz)   Height: 165.1 cm (65\")   PainSc: 0-No pain     Estimated body mass index is 23.83 kg/m² as calculated from the following:    Height as of this encounter: 165.1 cm (65\").    Weight as of this encounter: 65 kg (143 lb 3.2 oz).    BMI is within normal parameters. No other follow-up for BMI required.      Does the patient have evidence of cognitive impairment? No    Physical Exam        General Appearance: alert, oriented x 3, no acute distress.  Skin: warm and dry.   HEENT: Atraumatic.  pupils round and reactive to light and accommodation, oral mucosa pink and moist.  Nares patent without epistaxis.  External auditory canals are patent tympanic membranes intact.  Neck: supple, no JVD, trachea midline.  No thyromegaly  Lungs: CTA, unlabored breathing effort.  Heart: RRR, normal S1 and S2, no S3, no rub.  Abdomen: soft, non-tender, no palpable bladder, present bowel sounds to auscultation ×4.  No guarding or rigidity.  Extremities: no clubbing, cyanosis or edema.  Good range of motion actively and passively.  Symmetric muscle strength and " development.  Ambulates independently.  No leg length discrepancies.  Normal dorsiflexion/plantar flexion of bilateral feet.  Quadriceps mechanism intact.  Logroll negative.  Daniel sign weakly positive to the left.  Neuro: normal speech and mental status.  Cranial nerves II through XII intact.  No anosmia. DTR 2+; proprioception intact.  No focal motor/sensory deficits.    HEALTH RISK ASSESSMENT    Smoking Status:  Social History     Tobacco Use   Smoking Status Never Smoker   Smokeless Tobacco Never Used     Alcohol Consumption:  Social History     Substance and Sexual Activity   Alcohol Use No     Fall Risk Screen:    STEADI Fall Risk Assessment was completed, and patient is at LOW risk for falls.Assessment completed on:7/25/2022    Depression Screening:  PHQ-2/PHQ-9 Depression Screening 7/25/2022   Retired PHQ-9 Total Score -   Retired Total Score -   Little Interest or Pleasure in Doing Things 0-->not at all   Feeling Down, Depressed or Hopeless 0-->not at all   PHQ-9: Brief Depression Severity Measure Score 0       Health Habits and Functional and Cognitive Screening:  Functional & Cognitive Status 7/23/2021   Do you have difficulty preparing food and eating? No   Do you have difficulty bathing yourself, getting dressed or grooming yourself? No   Do you have difficulty using the toilet? No   Do you have difficulty moving around from place to place? No   Do you have trouble with steps or getting out of a bed or a chair? No   Current Diet Well Balanced Diet   Dental Exam Up to date   Eye Exam Up to date        Eye Exam Comment -   Exercise (times per week) 7 times per week   Current Exercises Include Walking   Current Exercise Activities Include -   Do you need help using the phone?  No   Are you deaf or do you have serious difficulty hearing?  No   Do you need help with transportation? No   Do you need help shopping? No   Do you need help preparing meals?  No   Do you need help with housework?  No   Do you need  help with laundry? No   Do you need help taking your medications? No   Do you need help managing money? No   Do you ever drive or ride in a car without wearing a seat belt? No   Have you felt unusual stress, anger or loneliness in the last month? -   Who do you live with? -   If you need help, do you have trouble finding someone available to you? -   Have you been bothered in the last four weeks by sexual problems? -   Do you have difficulty concentrating, remembering or making decisions? -       Age-appropriate Screening Schedule:  Refer to the list below for future screening recommendations based on patient's age, sex and/or medical conditions. Orders for these recommended tests are listed in the plan section. The patient has been provided with a written plan.    Health Maintenance   Topic Date Due   • DXA SCAN  01/08/2018   • ZOSTER VACCINE (2 of 2) 04/02/2019   • LIPID PANEL  Never done   • INFLUENZA VACCINE  10/01/2022   • TDAP/TD VACCINES (3 - Td or Tdap) 01/08/2029              Assessment & Plan   CMS Preventative Services Quick Reference  Risk Factors Identified During Encounter  Cardiovascular Disease  The above risks/problems have been discussed with the patient.  Follow up actions/plans if indicated are seen below in the Assessment/Plan Section.  Pertinent information has been shared with the patient in the After Visit Summary.    Diagnoses and all orders for this visit:    1. Medicare annual wellness visit, subsequent (Primary)  -     CBC & Differential  -     Comprehensive Metabolic Panel    2. Encounter for screening for cardiovascular disorders    3. Acquired hypothyroidism  -     TSH  -     T4, Free    4. Dyslipidemia  -     Comprehensive Metabolic Panel  -     TSH  -     T4, Free        Follow Up:   No follow-ups on file.     An After Visit Summary and PPPS were made available to the patient.    Vital signs demonstrate hemodynamic stability.  No findings of unstable angina.    Continue  low-cholesterol intake.  Surveillance CMP today.    Surveillance CBC.    Function studies, dose adjustment if indicated.        I spent 30 minutes caring for Faith on this date of service. This time includes time spent by me in the following activities:preparing for the visit, performing a medically appropriate examination and/or evaluation , counseling and educating the patient/family/caregiver, ordering medications, tests, or procedures, documenting information in the medical record and care coordination    I have reviewed and updated all copied forward information, as appropriate.  I attest to the accuracy and relevance of any unchanged information.

## 2022-07-25 NOTE — PATIENT INSTRUCTIONS
Medicare Wellness  Personal Prevention Plan of Service     Date of Office Visit:    Encounter Provider:  Cristiano Cat DO  Place of Service:  [facility]  Patient Name: Faith Cisneros  :  1945    As part of the Medicare Wellness portion of your visit today, we are providing you with this personalized preventive plan of services (PPPS). This plan is based upon recommendations of the [place] Preventive Services Task Force (USPSTF) and the Advisory Committee on Immunization Practices (ACIP).    This lists the preventive care services that should be considered, and provides dates of when you are due. Items listed as completed are up-to-date and do not require any further intervention.    Health Maintenance   Topic Date Due   • HEPATITIS C SCREENING  Never done   • DXA SCAN  2018   • ZOSTER VACCINE (2 of 2) 2019   • LIPID PANEL  Never done   • ANNUAL WELLNESS VISIT  2022   • INFLUENZA VACCINE  10/01/2022   • COLORECTAL CANCER SCREENING  2025   • TDAP/TD VACCINES (3 - Td or Tdap) 2029   • COVID-19 Vaccine  Completed   • Pneumococcal Vaccine 65+  Completed       No orders of the defined types were placed in this encounter.      No follow-ups on file.

## 2022-07-26 LAB
ALBUMIN SERPL-MCNC: 4.2 G/DL (ref 3.5–5.2)
ALBUMIN/GLOB SERPL: 2.2 G/DL
ALP SERPL-CCNC: 83 U/L (ref 39–117)
ALT SERPL-CCNC: 13 U/L (ref 1–33)
AST SERPL-CCNC: 18 U/L (ref 1–32)
BASOPHILS # BLD AUTO: 0.04 10*3/MM3 (ref 0–0.2)
BASOPHILS NFR BLD AUTO: 0.6 % (ref 0–1.5)
BILIRUB SERPL-MCNC: 0.6 MG/DL (ref 0–1.2)
BUN SERPL-MCNC: 6 MG/DL (ref 8–23)
BUN/CREAT SERPL: 7.3 (ref 7–25)
CALCIUM SERPL-MCNC: 9.3 MG/DL (ref 8.6–10.5)
CHLORIDE SERPL-SCNC: 104 MMOL/L (ref 98–107)
CO2 SERPL-SCNC: 24.3 MMOL/L (ref 22–29)
CREAT SERPL-MCNC: 0.82 MG/DL (ref 0.57–1)
EGFRCR SERPLBLD CKD-EPI 2021: 74.7 ML/MIN/1.73
EOSINOPHIL # BLD AUTO: 0.29 10*3/MM3 (ref 0–0.4)
EOSINOPHIL NFR BLD AUTO: 4 % (ref 0.3–6.2)
ERYTHROCYTE [DISTWIDTH] IN BLOOD BY AUTOMATED COUNT: 11.7 % (ref 12.3–15.4)
GLOBULIN SER CALC-MCNC: 1.9 GM/DL
GLUCOSE SERPL-MCNC: 88 MG/DL (ref 65–99)
HCT VFR BLD AUTO: 37.1 % (ref 34–46.6)
HGB BLD-MCNC: 12.4 G/DL (ref 12–15.9)
IMM GRANULOCYTES # BLD AUTO: 0.02 10*3/MM3 (ref 0–0.05)
IMM GRANULOCYTES NFR BLD AUTO: 0.3 % (ref 0–0.5)
LYMPHOCYTES # BLD AUTO: 1.55 10*3/MM3 (ref 0.7–3.1)
LYMPHOCYTES NFR BLD AUTO: 21.4 % (ref 19.6–45.3)
MCH RBC QN AUTO: 32.5 PG (ref 26.6–33)
MCHC RBC AUTO-ENTMCNC: 33.4 G/DL (ref 31.5–35.7)
MCV RBC AUTO: 97.4 FL (ref 79–97)
MONOCYTES # BLD AUTO: 0.49 10*3/MM3 (ref 0.1–0.9)
MONOCYTES NFR BLD AUTO: 6.8 % (ref 5–12)
NEUTROPHILS # BLD AUTO: 4.85 10*3/MM3 (ref 1.7–7)
NEUTROPHILS NFR BLD AUTO: 66.9 % (ref 42.7–76)
NRBC BLD AUTO-RTO: 0 /100 WBC (ref 0–0.2)
PLATELET # BLD AUTO: 274 10*3/MM3 (ref 140–450)
POTASSIUM SERPL-SCNC: 4.6 MMOL/L (ref 3.5–5.2)
PROT SERPL-MCNC: 6.1 G/DL (ref 6–8.5)
RBC # BLD AUTO: 3.81 10*6/MM3 (ref 3.77–5.28)
SODIUM SERPL-SCNC: 142 MMOL/L (ref 136–145)
T4 FREE SERPL-MCNC: 1.49 NG/DL (ref 0.93–1.7)
TSH SERPL DL<=0.005 MIU/L-ACNC: 2.08 UIU/ML (ref 0.27–4.2)
WBC # BLD AUTO: 7.24 10*3/MM3 (ref 3.4–10.8)

## 2022-11-07 RX ORDER — ATORVASTATIN CALCIUM 10 MG/1
TABLET, FILM COATED ORAL
Qty: 90 TABLET | Refills: 3 | Status: SHIPPED | OUTPATIENT
Start: 2022-11-07

## 2022-11-07 RX ORDER — LEVOTHYROXINE SODIUM 0.05 MG/1
TABLET ORAL
Qty: 90 TABLET | Refills: 3 | Status: SHIPPED | OUTPATIENT
Start: 2022-11-07

## 2022-11-07 RX ORDER — PROPRANOLOL HYDROCHLORIDE 80 MG/1
CAPSULE, EXTENDED RELEASE ORAL
Qty: 90 CAPSULE | Refills: 3 | Status: SHIPPED | OUTPATIENT
Start: 2022-11-07

## 2022-12-30 ENCOUNTER — OFFICE VISIT (OUTPATIENT)
Dept: FAMILY MEDICINE CLINIC | Facility: CLINIC | Age: 77
End: 2022-12-30

## 2022-12-30 VITALS
RESPIRATION RATE: 16 BRPM | TEMPERATURE: 98.5 F | HEIGHT: 65 IN | BODY MASS INDEX: 22.66 KG/M2 | OXYGEN SATURATION: 100 % | WEIGHT: 136 LBS | SYSTOLIC BLOOD PRESSURE: 138 MMHG | HEART RATE: 63 BPM | DIASTOLIC BLOOD PRESSURE: 80 MMHG
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
## 2022-12-30 DIAGNOSIS — S00.83XD FACIAL CONTUSION, SUBSEQUENT ENCOUNTER: ICD-10-CM

## 2022-12-30 DIAGNOSIS — S01.511D: ICD-10-CM

## 2022-12-30 PROCEDURE — 99213 OFFICE O/P EST LOW 20 MIN: CPT | Performed by: NURSE PRACTITIONER

## 2022-12-30 NOTE — PATIENT INSTRUCTIONS
--patient to continue applying vaseline to lip laceration as needed  --patient will return on Monday or Tuesday of next week for suture removal

## 2022-12-30 NOTE — PROGRESS NOTES
"                      Established Patient        Chief Complaint:   Chief Complaint   Patient presents with   • Fall     FOLLOW UP FROM FALL   STITCHES IN LIP AND BRUISES          History of Present Illness:    Faith Cisneros is a 77 y.o. female who presents today for follow up from fall. Patient fell on Tuesday 12/27 and hit left side of her face and mouth on sidewalk. Patient suffered bruising to left cheek bone and chin as well as laceration to left upper lip. Patient did lose consciousness when she first hit the ground. Patient received sutures to upper lip. Reports that she has been applying triple antibiotic ointment to left outer lip and vaseline to inner lip. Patient denies pain at this time.     Subjective     The following portions of the patient's history were reviewed and updated as appropriate: allergies, current medications, past family history, past medical history, past social history, past surgical history and problem list.    ALLERGIES  No Known Allergies    ROS  Review of Systems   Constitutional: Negative for fatigue and fever.   HENT: Negative for congestion.    Respiratory: Negative for cough and shortness of breath.    Gastrointestinal: Negative for diarrhea, nausea and vomiting.   Skin: Positive for color change (bruising left cheek) and wound (laceration left upper lip with sutures).   Neurological: Negative for weakness and headaches.   Psychiatric/Behavioral: Negative for sleep disturbance.   All other systems reviewed and are negative.      Objective     Vital Signs:   /80   Pulse 63   Temp 98.5 °F (36.9 °C)   Resp 16   Ht 165.1 cm (65\")   Wt 61.7 kg (136 lb)   SpO2 100%   BMI 22.63 kg/m²     Physical Exam   Physical Exam  Vitals and nursing note reviewed.   HENT:      Head: Normocephalic.      Nose: Nose normal.      Mouth/Throat:      Lips: Pink.   Eyes:      General: Lids are normal.      Conjunctiva/sclera: Conjunctivae normal.      Pupils: Pupils are equal, round, " and reactive to light.   Cardiovascular:      Rate and Rhythm: Normal rate.      Heart sounds: Normal heart sounds.   Pulmonary:      Effort: Pulmonary effort is normal.      Breath sounds: Normal breath sounds.   Abdominal:      General: Bowel sounds are normal.   Musculoskeletal:         General: Normal range of motion.   Skin:     General: Skin is warm and dry.      Findings: Signs of injury present.             Comments: Laceration to left upper lip, well-approximated with sutures in place  Bruising to left cheek bone and chin   Neurological:      Mental Status: She is alert and oriented to person, place, and time.      Gait: Gait is intact.   Psychiatric:         Attention and Perception: Attention normal.         Mood and Affect: Mood and affect normal.         Speech: Speech normal.         Behavior: Behavior normal. Behavior is cooperative.         Assessment and Plan      Assessment/Plan:   Diagnoses and all orders for this visit:    1. Fall, subsequent encounter (Primary)    2. Laceration of upper lip with complication, subsequent encounter    3. Facial contusion, subsequent encounter      Discussion Summary:  Discussed plan of care in detail with pt today; pt verb understanding and agrees.    Follow up:  Return in about 4 days (around 1/3/2023).     Patient Education:  Patient Instructions   --patient to continue applying vaseline to lip laceration as needed  --patient will return on Monday or Tuesday of next week for suture removal      MAKENZIE Montgomery  12/30/22  12:13 EST          Please note that portions of this note may have been completed with a voice recognition program.

## 2023-01-03 ENCOUNTER — OFFICE VISIT (OUTPATIENT)
Dept: FAMILY MEDICINE CLINIC | Facility: CLINIC | Age: 78
End: 2023-01-03
Payer: MEDICARE

## 2023-01-03 VITALS
HEIGHT: 65 IN | RESPIRATION RATE: 16 BRPM | HEART RATE: 71 BPM | SYSTOLIC BLOOD PRESSURE: 176 MMHG | OXYGEN SATURATION: 99 % | DIASTOLIC BLOOD PRESSURE: 82 MMHG | BODY MASS INDEX: 22.66 KG/M2 | TEMPERATURE: 97.1 F | WEIGHT: 136 LBS

## 2023-01-03 DIAGNOSIS — Z48.02 ENCOUNTER FOR REMOVAL OF SUTURES: Primary | ICD-10-CM

## 2023-01-03 DIAGNOSIS — S01.511D: ICD-10-CM

## 2023-01-03 PROCEDURE — 1159F MED LIST DOCD IN RCRD: CPT | Performed by: NURSE PRACTITIONER

## 2023-01-03 PROCEDURE — 1160F RVW MEDS BY RX/DR IN RCRD: CPT | Performed by: NURSE PRACTITIONER

## 2023-01-03 PROCEDURE — 99213 OFFICE O/P EST LOW 20 MIN: CPT | Performed by: NURSE PRACTITIONER

## 2023-01-03 NOTE — PROGRESS NOTES
Established Patient        Chief Complaint:   Chief Complaint   Patient presents with   • Suture / Staple Removal     UPPER LIP          History of Present Illness:    Faith Cisneros is a 77 y.o. female who presents today for suture removal of laceration on upper lip. Patient was seen in clinic last week following visit to ED on 12/27/22 for fall that resulted in laceration to top lip as well as contusions and abrasions to left side of her face. Patient has been applying vaseline to the laceration and it is healing nicely, well approximated with minimal scabbing.    Subjective     The following portions of the patient's history were reviewed and updated as appropriate: allergies, current medications, past family history, past medical history, past social history, past surgical history and problem list.    ALLERGIES  No Known Allergies    ROS  Review of Systems   Skin: Positive for wound.   All other systems reviewed and are negative.      Objective     Vital Signs:   /82   Pulse 71   Temp 97.1 °F (36.2 °C)   Resp 16   Ht 165.1 cm (65\")   Wt 61.7 kg (136 lb)   SpO2 99%   BMI 22.63 kg/m²     Physical Exam   Physical Exam  Vitals and nursing note reviewed.   HENT:      Head: Normocephalic.      Mouth/Throat:      Mouth: Mucous membranes are moist.   Eyes:      Pupils: Pupils are equal, round, and reactive to light.   Skin:     Findings: Laceration present.             Comments: Black sutures present on external upper lip, white dissolvable sutures on internal upper lip   Neurological:      Mental Status: She is alert and oriented to person, place, and time.   Psychiatric:         Mood and Affect: Mood normal.         Behavior: Behavior normal.     Suture Removal    Date/Time: 1/3/2023 8:34 AM  Performed by: Sommer Fields APRN  Authorized by: Sommer Fields APRN   Body area: mouth  Wound Appearance: clean and pink  Sutures Removed: 5  Staples Removed: 0  Patient  tolerance: patient tolerated the procedure well with no immediate complications        Assessment and Plan      Assessment/Plan:   Diagnoses and all orders for this visit:    1. Encounter for removal of sutures (Primary)    2. Laceration of upper lip with complication, subsequent encounter    Other orders  -     Suture Removal        Discussion Summary:  Discussed plan of care in detail with pt today; pt verb understanding and agrees.    Follow up:  Return for Next scheduled follow up.     Patient Education:  Patient Instructions   --continue to apply vaseline as needed to lip laceration and facial abrasions      MAKENZIE Montgomery  01/03/23  08:38 EST          Please note that portions of this note may have been completed with a voice recognition program.

## 2023-01-27 ENCOUNTER — OFFICE VISIT (OUTPATIENT)
Dept: FAMILY MEDICINE CLINIC | Facility: CLINIC | Age: 78
End: 2023-01-27
Payer: MEDICARE

## 2023-01-27 VITALS
TEMPERATURE: 97.5 F | HEART RATE: 60 BPM | OXYGEN SATURATION: 99 % | SYSTOLIC BLOOD PRESSURE: 126 MMHG | RESPIRATION RATE: 15 BRPM | BODY MASS INDEX: 23.22 KG/M2 | WEIGHT: 139.4 LBS | HEIGHT: 65 IN | DIASTOLIC BLOOD PRESSURE: 78 MMHG

## 2023-01-27 DIAGNOSIS — E78.5 DYSLIPIDEMIA: ICD-10-CM

## 2023-01-27 DIAGNOSIS — I10 ESSENTIAL HYPERTENSION: Primary | Chronic | ICD-10-CM

## 2023-01-27 DIAGNOSIS — E03.9 ACQUIRED HYPOTHYROIDISM: Chronic | ICD-10-CM

## 2023-01-27 DIAGNOSIS — E53.8 VITAMIN B12 DEFICIENCY: ICD-10-CM

## 2023-01-27 PROBLEM — N39.0 RECURRENT URINARY TRACT INFECTION: Status: ACTIVE | Noted: 2018-06-21

## 2023-01-27 PROBLEM — Z90.710 HX OF HYSTERECTOMY: Status: ACTIVE | Noted: 2018-12-19

## 2023-01-27 PROBLEM — N60.19 FIBROCYSTIC DISEASE OF BREAST: Status: ACTIVE | Noted: 2017-02-08

## 2023-01-27 PROCEDURE — 99214 OFFICE O/P EST MOD 30 MIN: CPT | Performed by: FAMILY MEDICINE

## 2023-01-27 NOTE — PROGRESS NOTES
Established Patient        Chief Complaint:   Chief Complaint   Patient presents with   • Hypothyroidism        Faith Cisneros is a 77 y.o. female    History of Present Illness:   Here today in scheduled follow-up visit of her hypertension, dyslipidemia, hypothyroidism and B12 deficiency.    She denies any problems with her current medication regimen.  Maintains an active lifestyle, balanced dietary intake.  She reports good hydration habits.  Denies chest pain, syncope, palpitations or vertigo.  Denies hematuria/dysuria.  Denies BRB/BTS.    She has healed well from the lip laceration that occurred as a result of mechanical fall.    Subjective     The following portions of the patient's history were reviewed and updated as appropriate: allergies, current medications, past family history, past medical history, past social history, past surgical history and problem list.    No Known Allergies    Review of Systems  1. Constitutional: Negative for fever. Negative for chills, diaphoresis, fatigue and unexpected weight change.   2. HENT: No dysphagia; no changes to vision/hearing/smell/taste; no epistaxis  3. Eyes: Negative for redness and visual disturbance.   4. Respiratory: negative for shortness of breath. Negative for chest pain . Negative for cough and chest tightness.   5. Cardiovascular: Negative for chest pain and palpitations.   6. Gastrointestinal: Negative for abdominal distention, abdominal pain and blood in stool.   7. Endocrine: Negative for cold intolerance and heat intolerance.   8. Genitourinary: Negative for difficulty urinating, dysuria and frequency.   9. Musculoskeletal: Negative for arthralgias, back pain and myalgias.   10. Skin: Negative for color change, rash and wound.   11. Neurological: Negative for syncope, weakness and headaches.   12. Hematological: Negative for adenopathy. Does not bruise/bleed easily.   13. Psychiatric/Behavioral: Negative for confusion. The  "patient is not nervous/anxious.    Objective     Physical Exam   Vital Signs: /78   Pulse 60   Temp 97.5 °F (36.4 °C)   Resp 15   Ht 165.1 cm (65\")   Wt 63.2 kg (139 lb 6.4 oz)   SpO2 99%   BMI 23.20 kg/m²   BMI is within normal parameters. No other follow-up for BMI required.    General Appearance: alert, oriented x 3, no acute distress.  Skin: warm and dry.   HEENT: Atraumatic.  pupils round and reactive to light and accommodation, oral mucosa pink and moist.  Nares patent without epistaxis.  External auditory canals are patent tympanic membranes intact.  Neck: supple, no JVD, trachea midline.  No thyromegaly  Lungs: CTA, unlabored breathing effort.  Heart: RRR, normal S1 and S2, no S3, no rub.  Abdomen: soft, non-tender, no palpable bladder, present bowel sounds to auscultation ×4.  No guarding or rigidity.  Extremities: no clubbing, cyanosis or edema.  Good range of motion actively and passively.  Symmetric muscle strength and development  Neuro: normal speech and mental status.  Cranial nerves II through XII intact.  No anosmia. DTR 2+; proprioception intact.  No focal motor/sensory deficits.    Advance Care Planning   ACP discussion was held with the patient during this visit. Patient does not have an advance directive, information provided.       Assessment and Plan      Assessment/Plan:   Diagnoses and all orders for this visit:    1. Essential hypertension (Primary)    2. Dyslipidemia    3. Acquired hypothyroidism    4. Vitamin B12 deficiency       Vital signs demonstrate hemodynamic stability, blood pressure is at goal.    Continue thyroid supplementation, thyroid function studies at next visit, dose adjustment if indicated.    Continue low-cholesterol dietary intake.  Continue statin therapy.  Surveillance labs at follow-up visit.    Any vitamin B12 supplementation.      Discussion Summary:    Discussed plan of care in detail with pt today; pt verb understanding and agrees.    I spent 30 " minutes caring for Faith on this date of service. This time includes time spent by me in the following activities:preparing for the visit, performing a medically appropriate examination and/or evaluation , counseling and educating the patient/family/caregiver, ordering medications, tests, or procedures, documenting information in the medical record and care coordination    I have reviewed and updated all copied forward information, as appropriate.  I attest to the accuracy and relevance of any unchanged information.    Follow up:  Return in about 6 months (around 7/27/2023) for medicare wellness, subsequent.     There are no Patient Instructions on file for this visit.    Cristiano Cat DO  01/27/23  22:48 EST

## 2023-10-02 ENCOUNTER — OFFICE VISIT (OUTPATIENT)
Dept: FAMILY MEDICINE CLINIC | Facility: CLINIC | Age: 78
End: 2023-10-02
Payer: MEDICARE

## 2023-10-02 VITALS
HEART RATE: 65 BPM | TEMPERATURE: 97 F | BODY MASS INDEX: 23.16 KG/M2 | SYSTOLIC BLOOD PRESSURE: 112 MMHG | DIASTOLIC BLOOD PRESSURE: 72 MMHG | WEIGHT: 139 LBS | HEIGHT: 65 IN | RESPIRATION RATE: 16 BRPM | OXYGEN SATURATION: 97 %

## 2023-10-02 DIAGNOSIS — I10 ESSENTIAL HYPERTENSION: Chronic | ICD-10-CM

## 2023-10-02 DIAGNOSIS — E78.5 DYSLIPIDEMIA: ICD-10-CM

## 2023-10-02 DIAGNOSIS — E03.9 ACQUIRED HYPOTHYROIDISM: Chronic | ICD-10-CM

## 2023-10-02 DIAGNOSIS — R41.81 AGE-RELATED COGNITIVE DECLINE: ICD-10-CM

## 2023-10-02 DIAGNOSIS — Z00.00 MEDICARE ANNUAL WELLNESS VISIT, SUBSEQUENT: Primary | ICD-10-CM

## 2023-10-02 PROCEDURE — G0439 PPPS, SUBSEQ VISIT: HCPCS | Performed by: FAMILY MEDICINE

## 2023-10-02 PROCEDURE — 3078F DIAST BP <80 MM HG: CPT | Performed by: FAMILY MEDICINE

## 2023-10-02 PROCEDURE — 3074F SYST BP LT 130 MM HG: CPT | Performed by: FAMILY MEDICINE

## 2023-10-02 PROCEDURE — 1170F FXNL STATUS ASSESSED: CPT | Performed by: FAMILY MEDICINE

## 2023-10-02 RX ORDER — DONEPEZIL HYDROCHLORIDE 5 MG/1
5 TABLET, FILM COATED ORAL NIGHTLY
Qty: 90 TABLET | Refills: 2 | Status: SHIPPED | OUTPATIENT
Start: 2023-10-02

## 2023-10-02 NOTE — PATIENT INSTRUCTIONS
Medicare Wellness  Personal Prevention Plan of Service     Date of Office Visit:    Encounter Provider:  Cristiano Cat DO  Place of Service:  [facility]  Patient Name: Faith Cisneros  :  1945    As part of the Medicare Wellness portion of your visit today, we are providing you with this personalized preventive plan of services (PPPS). This plan is based upon recommendations of the [place] Preventive Services Task Force (USPSTF) and the Advisory Committee on Immunization Practices (ACIP).    This lists the preventive care services that should be considered, and provides dates of when you are due. Items listed as completed are up-to-date and do not require any further intervention.    Health Maintenance   Topic Date Due   • HEPATITIS C SCREENING  Never done   • DXA SCAN  2018   • LIPID PANEL  Never done   • ANNUAL WELLNESS VISIT  2023   • TDAP/TD VACCINES (5 - Td or Tdap) 2032   • COVID-19 Vaccine  Completed   • INFLUENZA VACCINE  Completed   • Pneumococcal Vaccine 65+  Completed   • ZOSTER VACCINE  Completed   • COLORECTAL CANCER SCREENING  Discontinued       No orders of the defined types were placed in this encounter.      No follow-ups on file.

## 2023-10-02 NOTE — PROGRESS NOTES
The ABCs of the Annual Wellness Visit  Subsequent Medicare Wellness Visit    Subjective      Faith Cisneros is a 78 y.o. female who presents for a Subsequent Medicare Wellness Visit.    Patient does admit to some memory deficits, not very often in nature.  Her  provide some input additionally, stating he had some of the family members have noticed that patient will at times asked the same question 4-5 times in a very short period of time, despite having been given an answer to the same question.  She does occasionally forget that they have traveled to a certain area or location in the recent past.  No other noticeable interruption with her daily life.    Pt's spouse states he has noticed occasionally he has similar symptoms additionally.    The following portions of the patient's history were reviewed and   updated as appropriate: allergies, current medications, past family history, past medical history, past social history, past surgical history, and problem list.    Compared to one year ago, the patient feels her physical   health is the same.    Compared to one year ago, the patient feels her mental   health is the same.    Recent Hospitalizations:  She was not admitted to the hospital during the last year.       Current Medical Providers:  Patient Care Team:  Cristiano Cat DO as PCP - General (Family Medicine)    Outpatient Medications Prior to Visit   Medication Sig Dispense Refill    atorvastatin (LIPITOR) 10 MG tablet TAKE 1 TABLET DAILY 90 tablet 3    Cholecalciferol (VITAMIN D3 PO) Take 25 mcg by mouth.      levothyroxine (SYNTHROID, LEVOTHROID) 50 MCG tablet TAKE 1 TABLET DAILY 90 tablet 3    Omega-3 Fatty Acids (FISH OIL) 1000 MG capsule capsule Take  by mouth Daily With Breakfast.      Probiotic Product (PROBIOTIC DAILY PO) Take  by mouth.      propranolol LA (INDERAL LA) 80 MG 24 hr capsule TAKE 1 CAPSULE DAILY 90 capsule 3    vitamin B-12 (CYANOCOBALAMIN) 1000 MCG tablet Take 1 tablet  by mouth Daily.      Psyllium (METAMUCIL FIBER PO) Take  by mouth. (Patient not taking: Reported on 10/2/2023)       No facility-administered medications prior to visit.       No opioid medication identified on active medication list. I have reviewed chart for other potential  high risk medication/s and harmful drug interactions in the elderly.        Aspirin is not on active medication list.  Aspirin use is not indicated based on review of current medical condition/s. Risk of harm outweighs potential benefits.  .    Patient Active Problem List   Diagnosis    Acquired hypothyroidism    BMI 22.0-22.9, adult    Dyslipidemia    Vitamin B12 deficiency    Hx of hysterectomy    Vitamin deficiency    Fibrocystic disease of breast    Essential hypertension    Recurrent urinary tract infection     Advance Care Planning   Advance Care Planning     Advance Directive is on file.  ACP discussion was held with the patient during this visit. Patient has an advance directive in EMR which is still valid.     Review of Systems  Constitutional: Negative for fever. Negative for chills, diaphoresis, fatigue and unexpected weight change.   HENT: No dysphagia; no changes to vision/hearing/smell/taste; no epistaxis  Eyes: Negative for redness and visual disturbance.   Respiratory: negative for shortness of breath. Negative for chest pain . Negative for cough and chest tightness.   Cardiovascular: Negative for chest pain and palpitations.   Gastrointestinal: Negative for abdominal distention, abdominal pain and blood in stool.   Endocrine: Negative for cold intolerance and heat intolerance.   Genitourinary: Negative for difficulty urinating, dysuria and frequency.   Musculoskeletal: Negative for arthralgias, back pain and myalgias.   Skin: Negative for color change, rash and wound.   Neurological: Negative for syncope, weakness and headaches.   Hematological: Negative for adenopathy. Does not bruise/bleed easily.   Psychiatric/Behavioral:  "Negative for confusion. The patient is not nervous/anxious.  Occasional memory issues as per above.       Objective    Vitals:    10/02/23 1357 10/02/23 1444   BP: 138/90 112/72   Pulse: 65    Resp: 16    Temp: 97 °F (36.1 °C)    SpO2: 97%    Weight: 63 kg (139 lb)    Height: 165.1 cm (65\")      Estimated body mass index is 23.13 kg/m² as calculated from the following:    Height as of this encounter: 165.1 cm (65\").    Weight as of this encounter: 63 kg (139 lb).    BMI is within normal parameters. No other follow-up for BMI required.    General Appearance: alert, oriented x 3, no acute distress.  Skin: warm and dry.   HEENT: Atraumatic.  pupils round and reactive to light and accommodation, oral mucosa pink and moist.  Nares patent without epistaxis.  External auditory canals are patent tympanic membranes intact.  Neck: supple, no JVD, trachea midline.  No thyromegaly  Lungs: CTA, unlabored breathing effort.  Heart: RRR, normal S1 and S2, no S3, no rub.  Abdomen: soft, non-tender, no palpable bladder, present bowel sounds to auscultation ×4.  No guarding or rigidity.  Extremities: no clubbing, cyanosis or edema.  Good range of motion actively and passively.  Symmetric muscle strength and development  Neuro: normal speech and mental status.  Cranial nerves II through XII intact.  No anosmia. DTR 2+; proprioception intact.  No focal motor/sensory deficits.    Does the patient have evidence of cognitive impairment?   Yes: New medication/medications started.            HEALTH RISK ASSESSMENT    Smoking Status:  Social History     Tobacco Use   Smoking Status Never    Passive exposure: Never   Smokeless Tobacco Never     Alcohol Consumption:  Social History     Substance and Sexual Activity   Alcohol Use No     Fall Risk Screen:    STEADI Fall Risk Assessment was completed, and patient is at LOW risk for falls.Assessment completed on:10/2/2023    Depression Screening:      10/2/2023     1:59 PM   PHQ-2/PHQ-9 Depression " Screening   Little Interest or Pleasure in Doing Things 0-->not at all   Feeling Down, Depressed or Hopeless 0-->not at all   PHQ-9: Brief Depression Severity Measure Score 0       Health Habits and Functional and Cognitive Screening:      10/2/2023     1:59 PM   Functional & Cognitive Status   Do you have difficulty preparing food and eating? No   Do you have difficulty bathing yourself, getting dressed or grooming yourself? No   Do you have difficulty using the toilet? No   Do you have difficulty moving around from place to place? No   Do you have trouble with steps or getting out of a bed or a chair? No   Current Diet Well Balanced Diet   Dental Exam Up to date   Eye Exam Up to date   Exercise (times per week) 7 times per week   Current Exercises Include Walking   Do you need help using the phone?  No   Are you deaf or do you have serious difficulty hearing?  No   Do you need help to go to places out of walking distance? No   Do you need help shopping? No   Do you need help preparing meals?  No   Do you need help with housework?  No   Do you need help with laundry? No   Do you need help taking your medications? No   Do you need help managing money? No   Do you ever drive or ride in a car without wearing a seat belt? No       Age-appropriate Screening Schedule:  Refer to the list below for future screening recommendations based on patient's age, sex and/or medical conditions. Orders for these recommended tests are listed in the plan section. The patient has been provided with a written plan.    Health Maintenance   Topic Date Due    HEPATITIS C SCREENING  Never done    DXA SCAN  01/08/2018    LIPID PANEL  Never done    ANNUAL WELLNESS VISIT  07/25/2023    TDAP/TD VACCINES (5 - Td or Tdap) 12/27/2032    COVID-19 Vaccine  Completed    INFLUENZA VACCINE  Completed    Pneumococcal Vaccine 65+  Completed    ZOSTER VACCINE  Completed    COLORECTAL CANCER SCREENING  Discontinued                  CMS Preventative Services  Quick Reference  Risk Factors Identified During Encounter:    None Identified    The above risks/problems have been discussed with the patient.  Pertinent information has been shared with the patient in the After Visit Summary.    Diagnoses and all orders for this visit:    1. Medicare annual wellness visit, subsequent (Primary)    2. Essential hypertension  -     Comprehensive Metabolic Panel  -     CBC & Differential    3. Acquired hypothyroidism  -     TSH  -     T4, Free    4. Dyslipidemia  -     Comprehensive Metabolic Panel    5. Age-related cognitive decline  -     donepezil (ARICEPT) 5 MG tablet; Take 1 tablet by mouth Every Night.  Dispense: 90 tablet; Refill: 2        Follow Up:   Next Medicare Wellness visit to be scheduled in 1 year.      An After Visit Summary and PPPS were made available to the patient.    I have discussed age/gender specific preventative healthcare issues in detail with patient today.  I have answered all of the questions.    Discussed need for reduction in sodium/salt/caffeine intake; improve sleep habits as able; inc formal CV exercise program with goal of vigorous activity most, if not all, days of the week; goal of 150 min of sustained HR CV exercise total per week.    Thyroid function studies today, dose adjustment if indicated.    Continue low-cholesterol dietary intake, continue statin therapy.    Patient is experiencing mild cognitive decline, of expected nature given her age.  Not life affecting, but she is interested in potential medication to slow the progression.  I recommend trial of donepezil, begin with 5 mg nightly.  I have asked that she notify the office should she develop any ill effects to the new medication.

## 2023-10-03 LAB
ALBUMIN SERPL-MCNC: 4.1 G/DL (ref 3.8–4.8)
ALBUMIN/GLOB SERPL: 2.1 {RATIO} (ref 1.2–2.2)
ALP SERPL-CCNC: 85 IU/L (ref 44–121)
ALT SERPL-CCNC: 16 IU/L (ref 0–32)
AST SERPL-CCNC: 27 IU/L (ref 0–40)
BASOPHILS # BLD AUTO: 0 X10E3/UL (ref 0–0.2)
BASOPHILS NFR BLD AUTO: 1 %
BILIRUB SERPL-MCNC: 0.6 MG/DL (ref 0–1.2)
BUN SERPL-MCNC: 7 MG/DL (ref 8–27)
BUN/CREAT SERPL: 9 (ref 12–28)
CALCIUM SERPL-MCNC: 9.2 MG/DL (ref 8.7–10.3)
CHLORIDE SERPL-SCNC: 104 MMOL/L (ref 96–106)
CO2 SERPL-SCNC: 19 MMOL/L (ref 20–29)
CREAT SERPL-MCNC: 0.78 MG/DL (ref 0.57–1)
EGFRCR SERPLBLD CKD-EPI 2021: 79 ML/MIN/1.73
EOSINOPHIL # BLD AUTO: 0.2 X10E3/UL (ref 0–0.4)
EOSINOPHIL NFR BLD AUTO: 3 %
ERYTHROCYTE [DISTWIDTH] IN BLOOD BY AUTOMATED COUNT: 12.2 % (ref 11.7–15.4)
GLOBULIN SER CALC-MCNC: 2 G/DL (ref 1.5–4.5)
GLUCOSE SERPL-MCNC: 84 MG/DL (ref 70–99)
HCT VFR BLD AUTO: 36.7 % (ref 34–46.6)
HGB BLD-MCNC: 12.2 G/DL (ref 11.1–15.9)
IMM GRANULOCYTES # BLD AUTO: 0 X10E3/UL (ref 0–0.1)
IMM GRANULOCYTES NFR BLD AUTO: 0 %
LYMPHOCYTES # BLD AUTO: 1.8 X10E3/UL (ref 0.7–3.1)
LYMPHOCYTES NFR BLD AUTO: 33 %
MCH RBC QN AUTO: 32.4 PG (ref 26.6–33)
MCHC RBC AUTO-ENTMCNC: 33.2 G/DL (ref 31.5–35.7)
MCV RBC AUTO: 98 FL (ref 79–97)
MONOCYTES # BLD AUTO: 0.5 X10E3/UL (ref 0.1–0.9)
MONOCYTES NFR BLD AUTO: 9 %
NEUTROPHILS # BLD AUTO: 2.9 X10E3/UL (ref 1.4–7)
NEUTROPHILS NFR BLD AUTO: 54 %
PLATELET # BLD AUTO: 185 X10E3/UL (ref 150–450)
POTASSIUM SERPL-SCNC: 4 MMOL/L (ref 3.5–5.2)
PROT SERPL-MCNC: 6.1 G/DL (ref 6–8.5)
RBC # BLD AUTO: 3.76 X10E6/UL (ref 3.77–5.28)
SODIUM SERPL-SCNC: 141 MMOL/L (ref 134–144)
T4 FREE SERPL-MCNC: 1.56 NG/DL (ref 0.82–1.77)
TSH SERPL DL<=0.005 MIU/L-ACNC: 0.75 UIU/ML (ref 0.45–4.5)
WBC # BLD AUTO: 5.4 X10E3/UL (ref 3.4–10.8)

## 2023-10-16 RX ORDER — ATORVASTATIN CALCIUM 10 MG/1
TABLET, FILM COATED ORAL
Qty: 90 TABLET | Refills: 3 | Status: SHIPPED | OUTPATIENT
Start: 2023-10-16

## 2023-10-16 RX ORDER — LEVOTHYROXINE SODIUM 0.05 MG/1
TABLET ORAL
Qty: 90 TABLET | Refills: 3 | Status: SHIPPED | OUTPATIENT
Start: 2023-10-16

## 2023-10-16 RX ORDER — PROPRANOLOL HYDROCHLORIDE 80 MG/1
CAPSULE, EXTENDED RELEASE ORAL
Qty: 90 CAPSULE | Refills: 3 | Status: SHIPPED | OUTPATIENT
Start: 2023-10-16

## 2024-03-08 ENCOUNTER — TELEPHONE (OUTPATIENT)
Dept: FAMILY MEDICINE CLINIC | Facility: CLINIC | Age: 79
End: 2024-03-08
Payer: MEDICARE

## 2024-04-22 ENCOUNTER — OFFICE VISIT (OUTPATIENT)
Dept: FAMILY MEDICINE CLINIC | Facility: CLINIC | Age: 79
End: 2024-04-22
Payer: MEDICARE

## 2024-04-22 VITALS
RESPIRATION RATE: 16 BRPM | HEIGHT: 65 IN | WEIGHT: 141 LBS | HEART RATE: 70 BPM | OXYGEN SATURATION: 98 % | BODY MASS INDEX: 23.49 KG/M2 | TEMPERATURE: 97.5 F | SYSTOLIC BLOOD PRESSURE: 160 MMHG | DIASTOLIC BLOOD PRESSURE: 80 MMHG

## 2024-04-22 DIAGNOSIS — E03.9 ACQUIRED HYPOTHYROIDISM: Chronic | ICD-10-CM

## 2024-04-22 DIAGNOSIS — I10 ESSENTIAL HYPERTENSION: Primary | Chronic | ICD-10-CM

## 2024-04-22 DIAGNOSIS — E78.5 DYSLIPIDEMIA: ICD-10-CM

## 2024-04-22 PROCEDURE — 3077F SYST BP >= 140 MM HG: CPT | Performed by: FAMILY MEDICINE

## 2024-04-22 PROCEDURE — 3079F DIAST BP 80-89 MM HG: CPT | Performed by: FAMILY MEDICINE

## 2024-04-22 PROCEDURE — 1159F MED LIST DOCD IN RCRD: CPT | Performed by: FAMILY MEDICINE

## 2024-04-22 PROCEDURE — 99214 OFFICE O/P EST MOD 30 MIN: CPT | Performed by: FAMILY MEDICINE

## 2024-04-22 PROCEDURE — 1160F RVW MEDS BY RX/DR IN RCRD: CPT | Performed by: FAMILY MEDICINE

## 2024-04-22 RX ORDER — PROPRANOLOL HYDROCHLORIDE 80 MG/1
80 CAPSULE, EXTENDED RELEASE ORAL DAILY
Qty: 90 CAPSULE | Refills: 3 | Status: SHIPPED | OUTPATIENT
Start: 2024-04-22

## 2024-04-22 RX ORDER — LEVOTHYROXINE SODIUM 0.05 MG/1
50 TABLET ORAL DAILY
Qty: 90 TABLET | Refills: 3 | Status: SHIPPED | OUTPATIENT
Start: 2024-04-22

## 2024-04-22 RX ORDER — ATORVASTATIN CALCIUM 10 MG/1
10 TABLET, FILM COATED ORAL DAILY
Qty: 90 TABLET | Refills: 3 | Status: SHIPPED | OUTPATIENT
Start: 2024-04-22

## 2024-04-22 NOTE — PROGRESS NOTES
Established Patient        Chief Complaint:   Chief Complaint   Patient presents with    Hypertension     Pt is here for a 6 month follow up.         Faith Cisneros is a 78 y.o. female    History of Present Illness:   Here today in scheduled follow-up visit of her hypertension, dyslipidemia and hypothyroidism.    Denies chest pain, syncope, palpitations or vertigo.  Denies fever, chills or night sweats.  Maintains an active lifestyle, balanced dietary intake; reports good hydration habits.  Denies hematuria/dysuria.  Denies any BRB/BTS.  No new rashes.  Denies orthopnea, epistaxis or hemoptysis.    Subjective     The following portions of the patient's history were reviewed and updated as appropriate: allergies, current medications, past family history, past medical history, past social history, past surgical history and problem list.    No Known Allergies    Review of Systems  Constitutional: Negative for fever. Negative for chills, diaphoresis, fatigue and unexpected weight change.   HENT: No dysphagia; no changes to vision/hearing/smell/taste; no epistaxis  Eyes: Negative for redness and visual disturbance.   Respiratory: negative for shortness of breath. Negative for chest pain . Negative for cough and chest tightness.   Cardiovascular: Negative for chest pain and palpitations.   Gastrointestinal: Negative for abdominal distention, abdominal pain and blood in stool.   Endocrine: Negative for cold intolerance and heat intolerance.   Genitourinary: Negative for difficulty urinating, dysuria and frequency.   Musculoskeletal: Negative for arthralgias, back pain and myalgias.   Skin: Negative for color change, rash and wound.   Neurological: Negative for syncope, weakness and headaches.   Hematological: Negative for adenopathy. Does not bruise/bleed easily.   Psychiatric/Behavioral: Negative for confusion. The patient is not nervous/anxious.    Objective     Physical Exam   Vital Signs: BP  "160/80   Pulse 70   Temp 97.5 °F (36.4 °C)   Resp 16   Ht 165.1 cm (65\")   Wt 64 kg (141 lb)   LMP  (LMP Unknown)   SpO2 98%   BMI 23.46 kg/m²   BMI is within normal parameters. No other follow-up for BMI required.    General Appearance: alert, oriented x 3, no acute distress.  Skin: warm and dry.   HEENT: Atraumatic.  pupils round and reactive to light and accommodation, oral mucosa pink and moist.  Nares patent without epistaxis.  External auditory canals are patent tympanic membranes intact.  Neck: supple, no JVD, trachea midline.  No thyromegaly  Lungs: CTA, unlabored breathing effort.  Heart: RRR, normal S1 and S2, no S3, no rub.  Abdomen: soft, non-tender, no palpable bladder, present bowel sounds to auscultation ×4.  No guarding or rigidity.  Extremities: no clubbing, cyanosis or edema.  Good range of motion actively and passively.  Symmetric muscle strength and development  Neuro: normal speech and mental status.  Cranial nerves II through XII intact.  No anosmia. DTR 2+; proprioception intact.  No focal motor/sensory deficits.        Assessment and Plan      Assessment/Plan:   Diagnoses and all orders for this visit:    1. Essential hypertension (Primary)  -     propranolol LA (INDERAL LA) 80 MG 24 hr capsule; Take 1 capsule by mouth Daily.  Dispense: 90 capsule; Refill: 3    2. Dyslipidemia  -     atorvastatin (LIPITOR) 10 MG tablet; Take 1 tablet by mouth Daily.  Dispense: 90 tablet; Refill: 3    3. Acquired hypothyroidism  -     levothyroxine (SYNTHROID, LEVOTHROID) 50 MCG tablet; Take 1 tablet by mouth Daily.  Dispense: 90 tablet; Refill: 3      Discussed need for reduction in sodium/salt/caffeine intake; improve sleep habits as able; inc formal CV exercise program with goal of vigorous activity most, if not all, days of the week; goal of 150 min of sustained HR CV exercise total per week.    Continue statin therapy, as well as low-cholesterol dietary intake.    Vital signs demonstrate " hemodynamic stability, blood pressure is at goal.    Continue thyroid supplementation, periodic thyroid function studies with dose adjustment when indicated.      Discussion Summary:    Discussed plan of care in detail with pt today; pt verb understanding and agrees.    I spent 30 minutes caring for Faith on this date of service. This time includes time spent by me in the following activities:preparing for the visit, performing a medically appropriate examination and/or evaluation , counseling and educating the patient/family/caregiver, ordering medications, tests, or procedures, documenting information in the medical record, and care coordination    I have reviewed and updated all copied forward information, as appropriate.  I attest to the accuracy and relevance of any unchanged information.    Follow up:  No follow-ups on file.     There are no Patient Instructions on file for this visit.    Cristiano Cat DO  04/22/24  16:06 EDT

## 2024-10-07 ENCOUNTER — OFFICE VISIT (OUTPATIENT)
Dept: FAMILY MEDICINE CLINIC | Facility: CLINIC | Age: 79
End: 2024-10-07
Payer: MEDICARE

## 2024-10-07 VITALS
DIASTOLIC BLOOD PRESSURE: 80 MMHG | OXYGEN SATURATION: 98 % | BODY MASS INDEX: 23.56 KG/M2 | HEART RATE: 56 BPM | WEIGHT: 141.4 LBS | SYSTOLIC BLOOD PRESSURE: 124 MMHG | HEIGHT: 65 IN

## 2024-10-07 DIAGNOSIS — E78.5 DYSLIPIDEMIA: ICD-10-CM

## 2024-10-07 DIAGNOSIS — Z00.00 MEDICARE ANNUAL WELLNESS VISIT, SUBSEQUENT: Primary | ICD-10-CM

## 2024-10-07 DIAGNOSIS — E03.9 ACQUIRED HYPOTHYROIDISM: Chronic | ICD-10-CM

## 2024-10-07 DIAGNOSIS — Z13.6 ENCOUNTER FOR SCREENING FOR CARDIOVASCULAR DISORDERS: ICD-10-CM

## 2024-10-07 PROCEDURE — G0439 PPPS, SUBSEQ VISIT: HCPCS | Performed by: FAMILY MEDICINE

## 2024-10-07 PROCEDURE — 1170F FXNL STATUS ASSESSED: CPT | Performed by: FAMILY MEDICINE

## 2024-10-07 PROCEDURE — 3074F SYST BP LT 130 MM HG: CPT | Performed by: FAMILY MEDICINE

## 2024-10-07 PROCEDURE — 1126F AMNT PAIN NOTED NONE PRSNT: CPT | Performed by: FAMILY MEDICINE

## 2024-10-07 PROCEDURE — 3079F DIAST BP 80-89 MM HG: CPT | Performed by: FAMILY MEDICINE

## 2024-10-07 NOTE — PROGRESS NOTES
Subjective   The ABCs of the Annual Wellness Visit  Medicare Wellness Visit      Faith Cisneros is a 79 y.o. patient who presents for a Medicare Wellness Visit.    The following portions of the patient's history were reviewed and   updated as appropriate: allergies, current medications, past family history, past medical history, past social history, past surgical history, and problem list.    Compared to one year ago, the patient's physical   health is the same.  Compared to one year ago, the patient's mental   health is the same.    Recent Hospitalizations:  She was not admitted to the hospital during the last year.     Current Medical Providers:  Patient Care Team:  Cristiano Cat DO as PCP - General (Family Medicine)    Outpatient Medications Prior to Visit   Medication Sig Dispense Refill    atorvastatin (LIPITOR) 10 MG tablet Take 1 tablet by mouth Daily. 90 tablet 3    Cholecalciferol (VITAMIN D3 PO) Take 25 mcg by mouth.      levothyroxine (SYNTHROID, LEVOTHROID) 50 MCG tablet Take 1 tablet by mouth Daily. 90 tablet 3    Probiotic Product (PROBIOTIC DAILY PO) Take  by mouth.      propranolol LA (INDERAL LA) 80 MG 24 hr capsule Take 1 capsule by mouth Daily. 90 capsule 3    vitamin B-12 (CYANOCOBALAMIN) 1000 MCG tablet Take 1 tablet by mouth Daily.      donepezil (ARICEPT) 5 MG tablet Take 1 tablet by mouth Every Night. 90 tablet 2    Omega-3 Fatty Acids (FISH OIL) 1000 MG capsule capsule Take  by mouth Daily With Breakfast.       No facility-administered medications prior to visit.     No opioid medication identified on active medication list. I have reviewed chart for other potential  high risk medication/s and harmful drug interactions in the elderly.      Aspirin is not on active medication list.  Aspirin use is not indicated based on review of current medical condition/s. Risk of harm outweighs potential benefits.  .    Patient Active Problem List   Diagnosis    Acquired hypothyroidism    BMI  "22.0-22.9, adult    Dyslipidemia    Vitamin B12 deficiency    Hx of hysterectomy    Vitamin deficiency    Fibrocystic disease of breast    Essential hypertension    Recurrent urinary tract infection     Advance Care Planning Advance Directive is on file.  ACP discussion was held with the patient during this visit. Patient has an advance directive in EMR which is still valid.       Review of Systems  Constitutional: Negative for fever. Negative for chills, diaphoresis, fatigue and unexpected weight change.   HENT: No dysphagia; no changes to vision/hearing/smell/taste; no epistaxis  Eyes: Negative for redness and visual disturbance.   Respiratory: negative for shortness of breath. Negative for chest pain . Negative for cough and chest tightness.   Cardiovascular: Negative for chest pain and palpitations.   Gastrointestinal: Negative for abdominal distention, abdominal pain and blood in stool.   Endocrine: Negative for cold intolerance and heat intolerance.   Genitourinary: Negative for difficulty urinating, dysuria and frequency.   Musculoskeletal: Negative for arthralgias, back pain and myalgias.   Skin: Negative for color change, rash and wound.   Neurological: Negative for syncope, weakness and headaches.   Hematological: Negative for adenopathy. Does not bruise/bleed easily.   Psychiatric/Behavioral: Negative for confusion. The patient is not nervous/anxious.      Objective   Vitals:    10/07/24 1055   BP: 124/80   Pulse: 56   SpO2: 98%   Weight: 64.1 kg (141 lb 6.4 oz)   Height: 165.1 cm (65\")   PainSc: 0-No pain       Estimated body mass index is 23.53 kg/m² as calculated from the following:    Height as of this encounter: 165.1 cm (65\").    Weight as of this encounter: 64.1 kg (141 lb 6.4 oz).    BMI is within normal parameters. No other follow-up for BMI required.    General Appearance: alert, oriented x 3, no acute distress.  Skin: warm and dry.   HEENT: Atraumatic.  pupils round and reactive to light and " accommodation, oral mucosa pink and moist.  Nares patent without epistaxis.  External auditory canals are patent tympanic membranes intact.  Neck: supple, no JVD, trachea midline.  No thyromegaly  Lungs: CTA, unlabored breathing effort.  Heart: RRR, normal S1 and S2, no S3, no rub.  Abdomen: soft, non-tender, no palpable bladder, present bowel sounds to auscultation ×4.  No guarding or rigidity.  Extremities: no clubbing, cyanosis or edema.  Good range of motion actively and passively.  Symmetric muscle strength and development  Neuro: normal speech and mental status.  Cranial nerves II through XII intact.  No anosmia. DTR 2+; proprioception intact.  No focal motor/sensory deficits.     Does the patient have evidence of cognitive impairment? No                                                                                               Health  Risk Assessment    Smoking Status:  Social History     Tobacco Use   Smoking Status Never    Passive exposure: Never   Smokeless Tobacco Never     Alcohol Consumption:  Social History     Substance and Sexual Activity   Alcohol Use No       Fall Risk Screen  STEADI Fall Risk Assessment was completed, and patient is at LOW risk for falls.Assessment completed on:10/7/2024    Depression Screening:      10/7/2024    10:53 AM   PHQ-2/PHQ-9 Depression Screening   Little Interest or Pleasure in Doing Things 0-->not at all   Feeling Down, Depressed or Hopeless 0-->not at all   PHQ-9: Brief Depression Severity Measure Score 0     Health Habits and Functional and Cognitive Screening:      10/7/2024    10:53 AM   Functional & Cognitive Status   Do you have difficulty preparing food and eating? No   Do you have difficulty bathing yourself, getting dressed or grooming yourself? No   Do you have difficulty using the toilet? No   Do you have difficulty moving around from place to place? No   Do you have trouble with steps or getting out of a bed or a chair? No   Current Diet Well Balanced  Diet   Dental Exam Up to date   Eye Exam Up to date   Exercise (times per week) 7 times per week   Current Exercises Include Walking   Do you need help using the phone?  No   Are you deaf or do you have serious difficulty hearing?  No   Do you need help to go to places out of walking distance? No   Do you need help shopping? No   Do you need help preparing meals?  No   Do you need help with housework?  No   Do you need help with laundry? No   Do you need help taking your medications? No   Do you need help managing money? No   Do you ever drive or ride in a car without wearing a seat belt? No   Have you felt unusual stress, anger or loneliness in the last month? No   Who do you live with? Spouse   If you need help, do you have trouble finding someone available to you? No   Have you been bothered in the last four weeks by sexual problems? No   Do you have difficulty concentrating, remembering or making decisions? No           Age-appropriate Screening Schedule:  Refer to the list below for future screening recommendations based on patient's age, sex and/or medical conditions. Orders for these recommended tests are listed in the plan section. The patient has been provided with a written plan.    Health Maintenance List  Health Maintenance   Topic Date Due    LIPID PANEL  Never done    HEPATITIS C SCREENING  Never done    DXA SCAN  01/08/2018    ANNUAL WELLNESS VISIT  10/02/2024    TDAP/TD VACCINES (6 - Td or Tdap) 08/21/2034    COVID-19 Vaccine  Completed    RSV Vaccine - Adults  Completed    INFLUENZA VACCINE  Completed    Pneumococcal Vaccine 65+  Completed    ZOSTER VACCINE  Completed    COLORECTAL CANCER SCREENING  Discontinued                                                                                                                                                CMS Preventative Services Quick Reference  Risk Factors Identified During Encounter  None Identified    The above risks/problems have been discussed  with the patient.  Pertinent information has been shared with the patient in the After Visit Summary.  An After Visit Summary and PPPS were made available to the patient.    Follow Up:  Next Medicare Wellness visit to be scheduled in 1 year.     Assessment & Plan  Medicare annual wellness visit, subsequent    Acquired hypothyroidism    Dyslipidemia    Encounter for screening for cardiovascular disorders    Orders Placed This Encounter   Procedures    TSH    T4, Free    Comprehensive Metabolic Panel    CBC w AUTO Differential             Follow Up:  No follow-ups on file.    I have discussed age/gender specific preventative healthcare issues in detail with patient today.  I have answered all of the questions.    Vital signs demonstrate hemodynamic stability, blood pressure is at goal.    I confirm accuracy of unchanged data/findings which have been carried forward from previous visit, as well as I have updated appropriately those that have changed.

## 2024-10-07 NOTE — PATIENT INSTRUCTIONS
Medicare Wellness  Personal Prevention Plan of Service     Date of Office Visit:    Encounter Provider:  Cristiano Cat DO  Place of Service:  [facility]  Patient Name: Faith Cisneros  :  1945    As part of the Medicare Wellness portion of your visit today, we are providing you with this personalized preventive plan of services (PPPS). This plan is based upon recommendations of the [place] Preventive Services Task Force (USPSTF) and the Advisory Committee on Immunization Practices (ACIP).    This lists the preventive care services that should be considered, and provides dates of when you are due. Items listed as completed are up-to-date and do not require any further intervention.    Health Maintenance   Topic Date Due   • LIPID PANEL  Never done   • HEPATITIS C SCREENING  Never done   • DXA SCAN  2018   • ANNUAL WELLNESS VISIT  10/02/2024   • TDAP/TD VACCINES (6 - Td or Tdap) 2034   • COVID-19 Vaccine  Completed   • RSV Vaccine - Adults  Completed   • INFLUENZA VACCINE  Completed   • Pneumococcal Vaccine 65+  Completed   • ZOSTER VACCINE  Completed   • COLORECTAL CANCER SCREENING  Discontinued       No orders of the defined types were placed in this encounter.      No follow-ups on file.

## 2024-10-08 LAB
ALBUMIN SERPL-MCNC: 4.2 G/DL (ref 3.5–5.2)
ALBUMIN/GLOB SERPL: 2.2 G/DL
ALP SERPL-CCNC: 113 U/L (ref 39–117)
ALT SERPL-CCNC: 6 U/L (ref 1–33)
AST SERPL-CCNC: 14 U/L (ref 1–32)
BASOPHILS # BLD AUTO: 0.04 10*3/MM3 (ref 0–0.2)
BASOPHILS NFR BLD AUTO: 0.7 % (ref 0–1.5)
BILIRUB SERPL-MCNC: 0.6 MG/DL (ref 0–1.2)
BUN SERPL-MCNC: 11 MG/DL (ref 8–23)
BUN/CREAT SERPL: 12.1 (ref 7–25)
CALCIUM SERPL-MCNC: 9.2 MG/DL (ref 8.6–10.5)
CHLORIDE SERPL-SCNC: 105 MMOL/L (ref 98–107)
CO2 SERPL-SCNC: 22.2 MMOL/L (ref 22–29)
CREAT SERPL-MCNC: 0.91 MG/DL (ref 0.57–1)
EGFRCR SERPLBLD CKD-EPI 2021: 65.1 ML/MIN/1.73
EOSINOPHIL # BLD AUTO: 0.25 10*3/MM3 (ref 0–0.4)
EOSINOPHIL NFR BLD AUTO: 4.3 % (ref 0.3–6.2)
ERYTHROCYTE [DISTWIDTH] IN BLOOD BY AUTOMATED COUNT: 12 % (ref 12.3–15.4)
GLOBULIN SER CALC-MCNC: 1.9 GM/DL
GLUCOSE SERPL-MCNC: 91 MG/DL (ref 65–99)
HCT VFR BLD AUTO: 36.4 % (ref 34–46.6)
HGB BLD-MCNC: 11.8 G/DL (ref 12–15.9)
IMM GRANULOCYTES # BLD AUTO: 0.02 10*3/MM3 (ref 0–0.05)
IMM GRANULOCYTES NFR BLD AUTO: 0.3 % (ref 0–0.5)
LYMPHOCYTES # BLD AUTO: 1.15 10*3/MM3 (ref 0.7–3.1)
LYMPHOCYTES NFR BLD AUTO: 19.9 % (ref 19.6–45.3)
MCH RBC QN AUTO: 31 PG (ref 26.6–33)
MCHC RBC AUTO-ENTMCNC: 32.4 G/DL (ref 31.5–35.7)
MCV RBC AUTO: 95.5 FL (ref 79–97)
MONOCYTES # BLD AUTO: 0.43 10*3/MM3 (ref 0.1–0.9)
MONOCYTES NFR BLD AUTO: 7.4 % (ref 5–12)
NEUTROPHILS # BLD AUTO: 3.89 10*3/MM3 (ref 1.7–7)
NEUTROPHILS NFR BLD AUTO: 67.4 % (ref 42.7–76)
NRBC BLD AUTO-RTO: 0 /100 WBC (ref 0–0.2)
PLATELET # BLD AUTO: 289 10*3/MM3 (ref 140–450)
POTASSIUM SERPL-SCNC: 4.3 MMOL/L (ref 3.5–5.2)
PROT SERPL-MCNC: 6.1 G/DL (ref 6–8.5)
RBC # BLD AUTO: 3.81 10*6/MM3 (ref 3.77–5.28)
SODIUM SERPL-SCNC: 140 MMOL/L (ref 136–145)
T4 FREE SERPL-MCNC: 1.48 NG/DL (ref 0.92–1.68)
TSH SERPL DL<=0.005 MIU/L-ACNC: 0.77 UIU/ML (ref 0.27–4.2)
WBC # BLD AUTO: 5.78 10*3/MM3 (ref 3.4–10.8)

## 2025-03-05 DIAGNOSIS — I10 ESSENTIAL HYPERTENSION: Chronic | ICD-10-CM

## 2025-03-05 DIAGNOSIS — E78.5 DYSLIPIDEMIA: ICD-10-CM

## 2025-03-06 RX ORDER — ATORVASTATIN CALCIUM 10 MG/1
10 TABLET, FILM COATED ORAL DAILY
Qty: 90 TABLET | Refills: 3 | Status: SHIPPED | OUTPATIENT
Start: 2025-03-06

## 2025-03-06 RX ORDER — PROPRANOLOL HYDROCHLORIDE 80 MG/1
80 CAPSULE, EXTENDED RELEASE ORAL DAILY
Qty: 90 CAPSULE | Refills: 3 | Status: SHIPPED | OUTPATIENT
Start: 2025-03-06

## 2025-03-06 NOTE — TELEPHONE ENCOUNTER
Rx Refill Note  Requested Prescriptions     Pending Prescriptions Disp Refills    propranolol LA (INDERAL LA) 80 MG 24 hr capsule 90 capsule 3     Sig: Take 1 capsule by mouth Daily.    atorvastatin (LIPITOR) 10 MG tablet 90 tablet 3     Sig: Take 1 tablet by mouth Daily.      Last office visit with prescribing clinician: 10/7/2024   Last telemedicine visit with prescribing clinician: Visit date not found   Next office visit with prescribing clinician: Visit date not found                         Would you like a call back once the refill request has been completed: [] Yes [] No    If the office needs to give you a call back, can they leave a voicemail: [] Yes [] No    Tamiko Pink MA  03/06/25, 16:20 EST

## 2025-03-12 DIAGNOSIS — E03.9 ACQUIRED HYPOTHYROIDISM: Chronic | ICD-10-CM

## 2025-03-13 RX ORDER — LEVOTHYROXINE SODIUM 50 MCG
50 TABLET ORAL DAILY
Qty: 90 TABLET | Refills: 3 | Status: SHIPPED | OUTPATIENT
Start: 2025-03-13

## 2025-03-13 NOTE — TELEPHONE ENCOUNTER
Rx Refill Note  Requested Prescriptions     Pending Prescriptions Disp Refills    Synthroid 50 MCG tablet [Pharmacy Med Name: SYNTHROID TAB 50MCG] 90 tablet 3     Sig: TAKE 1 TABLET DAILY      Last office visit with prescribing clinician: 10/7/2024   Last telemedicine visit with prescribing clinician: Visit date not found   Next office visit with prescribing clinician: Visit date not found                         Would you like a call back once the refill request has been completed: [] Yes [] No    If the office needs to give you a call back, can they leave a voicemail: [] Yes [] No    Tennille Cerda MA  03/13/25, 13:13 EDT

## 2025-04-09 ENCOUNTER — OFFICE VISIT (OUTPATIENT)
Dept: FAMILY MEDICINE CLINIC | Facility: CLINIC | Age: 80
End: 2025-04-09
Payer: MEDICARE

## 2025-04-09 VITALS
SYSTOLIC BLOOD PRESSURE: 132 MMHG | WEIGHT: 129 LBS | HEIGHT: 65 IN | HEART RATE: 63 BPM | DIASTOLIC BLOOD PRESSURE: 70 MMHG | BODY MASS INDEX: 21.49 KG/M2 | OXYGEN SATURATION: 96 % | RESPIRATION RATE: 16 BRPM

## 2025-04-09 DIAGNOSIS — E78.5 DYSLIPIDEMIA: ICD-10-CM

## 2025-04-09 DIAGNOSIS — Z13.1 SCREENING FOR DIABETES MELLITUS: ICD-10-CM

## 2025-04-09 DIAGNOSIS — Z78.0 POSTMENOPAUSE: ICD-10-CM

## 2025-04-09 DIAGNOSIS — E03.9 ACQUIRED HYPOTHYROIDISM: Chronic | ICD-10-CM

## 2025-04-09 DIAGNOSIS — Z13.820 SCREENING FOR OSTEOPOROSIS: ICD-10-CM

## 2025-04-09 DIAGNOSIS — R41.81 AGE-RELATED COGNITIVE DECLINE: ICD-10-CM

## 2025-04-09 DIAGNOSIS — I10 ESSENTIAL HYPERTENSION: Primary | Chronic | ICD-10-CM

## 2025-04-09 DIAGNOSIS — Z00.00 ANNUAL PHYSICAL EXAM: ICD-10-CM

## 2025-04-09 DIAGNOSIS — Z76.89 ENCOUNTER TO ESTABLISH CARE WITH NEW DOCTOR: ICD-10-CM

## 2025-04-09 NOTE — PROGRESS NOTES
New Patient Office Visit      Date: 2025  Patient Name: Faith Cisneros  : 1945   MRN: [id number]     Chief Complaint:    Chief Complaint   Patient presents with    Establish Care     Previously saw angel Cat for dexa scan    Hypertension    Hypothyroidism    Hyperlipidemia       History of Present Illness: Faith Cisneros is a 79 y.o. female who is here today to re-establish care with new provider as her previous provider has moved.    Patient reports that she has been doing well. Patient reports that she does try and stay active and regularly dances. Patient denies concerns or complaints at this time.     Patient is taking propranolol for hypertension.    Patient reports that she is taking atorvastatin for cholesterol.    Patient reports that she is taking levothyroxine for hypothyroidism.    Patient is on donepezil for age related memory decline.      Subjective     Past Medical History:   Past Medical History:   Diagnosis Date    Colon polyp     Hyperlipidemia     Hypothyroidism     Irregular heart rate     Mitral valve prolapse     Ovarian cyst     Thyroid disease        Past Surgical History:   Past Surgical History:   Procedure Laterality Date    APPENDECTOMY      BREAST EXCISIONAL BIOPSY Bilateral     right- 2015    HYSTERECTOMY  1974    OOPHORECTOMY Right 1974    OOPHORECTOMY      TONSILLECTOMY         Family History:   Family History   Problem Relation Age of Onset    Colon cancer Mother     Kidney disease Mother     Heart attack Father     Breast cancer Neg Hx     Ovarian cancer Neg Hx        Social History:   Social History     Socioeconomic History    Marital status:    Tobacco Use    Smoking status: Never     Passive exposure: Never    Smokeless tobacco: Never   Vaping Use    Vaping status: Never Used   Substance and Sexual Activity    Alcohol use: No    Drug use: Never    Sexual activity: Yes     Partners: Male       Medications:     Current Outpatient  "Medications:     atorvastatin (LIPITOR) 10 MG tablet, Take 1 tablet by mouth Daily., Disp: 90 tablet, Rfl: 3    donepezil (ARICEPT) 5 MG tablet, Take 1 tablet by mouth Every Night., Disp: 90 tablet, Rfl: 2    NON FORMULARY, OTC allergy, Disp: , Rfl:     propranolol LA (INDERAL LA) 80 MG 24 hr capsule, Take 1 capsule by mouth Daily., Disp: 90 capsule, Rfl: 3    Synthroid 50 MCG tablet, TAKE 1 TABLET DAILY, Disp: 90 tablet, Rfl: 3    Allergies:   No Known Allergies    Objective     Physical Exam:  Vital Signs:   Vitals:    04/09/25 0927   BP: 132/70   Pulse: 63   Resp: 16   SpO2: 96%   Weight: 58.5 kg (129 lb)   Height: 165.1 cm (65\")     Body mass index is 21.47 kg/m².   Facility age limit for growth %carol is 20 years.  BMI is within normal parameters. No other follow-up for BMI required.       Physical Exam    General:  Well appearing elderly female in no acute distress. Alert and oriented. Vitals reviewed and are within normal limits.  Head/ENT: Atraumatic. No facial/sinus tenderness to palpation. Tympanic membranes are normal bilaterally with normal appearing auditory canals. Oral cavity unremarkable for acute findings.   Neck: Anatomy appears symmetrical. There is no palpable lymphadenopathy to palpation.  Cardiac: Regular rate and rhythm to auscultation.   Pulmonary: Lungs are clear to auscultation bilaterally.  Abdomen: Normal appearing abdomen. Normal bowel sounds to auscultation. Non-tender to palpation of upper and lower abdominal quadrants bilaterally.  Extremities: Upper extremities demonstrate preserved range of motion. There appears to be no evidence of edema bilaterally. Lower extremities demonstrate preserved range of motion - both passive and active. There is no appreciated lower extremity edema bilaterally to palpation.  Integumentary/Skin: Skin appears unremarkable from observable skin surfaces.   Neurological: Cranial nerves appear grossly intact. Cerebellar function appears preserved. Motor " function preserved in bilateral upper and lower extremities. Sensation preserved in face/head and bilateral upper and lower extremities. Normal gait and speech.  Behavioral/Psych: Patient behavior/demeanor appears consistent with reported age. Patient is pleasant with normal affect today.      Procedures      Assessment / Plan      1. Encounter to establish care with new doctor    2. Essential hypertension  - CBC w AUTO Differential  - Comprehensive metabolic panel    3. Acquired hypothyroidism  - TSH Rfx On Abnormal To Free T4    4. Dyslipidemia  - Lipid panel    5. Age-related cognitive decline    6. Postmenopause  - DEXA Bone Density Axial; Future    7. Annual physical exam    8. Screening for osteoporosis  - DEXA Bone Density Axial; Future    9. Screening for diabetes mellitus  - Hemoglobin A1c     Assessment & Plan  1. Hypertension:  - Blood pressure well controlled on current dosage of propranolol  - No change in medication at this time  - Will refill when needed    2. Hypothyroidism:  - Currently on Synthroid 50 mcg  - Thyroid lab to be obtained today with medication to be adjusted as needed    3. Hyperlipidemia:  - Currently on 10 mg of atorvastatin  - Lipid panel to be obtained today and medication adjusted as needed    4. Age-related cognitive decline:  - Currently on Aricept 5 mg nightly  - Patient will continue to monitor for acute changes    5. Health maintenance:  - Annual physical examination unremarkable today  - Screening/surveillance labs including CBC, CMP, TSH, lipid panel, and A1c to be obtained  - DEXA scan for osteoporosis screening to be obtained today    Follow-up:  - Patient will follow up in 3 months       Patient or patient representative verbalized consent for the use of Ambient Listening during the visit with  Francisco Causey MD for chart documentation. 4/10/2025  10:05 EDT     Follow Up:   Return in about 3 months (around 7/9/2025).      Francisco Causey MD  [facility]  [place]  [facility]  [address] [place] [address]  Fax [phone]  Phone [phone]

## 2025-04-10 ENCOUNTER — RESULTS FOLLOW-UP (OUTPATIENT)
Dept: FAMILY MEDICINE CLINIC | Facility: CLINIC | Age: 80
End: 2025-04-10
Payer: MEDICARE

## 2025-04-10 LAB
ALBUMIN SERPL-MCNC: 3.4 G/DL (ref 3.5–5.2)
ALBUMIN/GLOB SERPL: 1.4 G/DL
ALP SERPL-CCNC: 133 U/L (ref 39–117)
ALT SERPL-CCNC: 5 U/L (ref 1–33)
AST SERPL-CCNC: 14 U/L (ref 1–32)
BASOPHILS # BLD AUTO: 0.04 10*3/MM3 (ref 0–0.2)
BASOPHILS NFR BLD AUTO: 0.5 % (ref 0–1.5)
BILIRUB SERPL-MCNC: 0.6 MG/DL (ref 0–1.2)
BUN SERPL-MCNC: 5 MG/DL (ref 8–23)
BUN/CREAT SERPL: 5.7 (ref 7–25)
CALCIUM SERPL-MCNC: 9.1 MG/DL (ref 8.6–10.5)
CHLORIDE SERPL-SCNC: 104 MMOL/L (ref 98–107)
CHOLEST SERPL-MCNC: 167 MG/DL (ref 0–200)
CO2 SERPL-SCNC: 26 MMOL/L (ref 22–29)
CREAT SERPL-MCNC: 0.88 MG/DL (ref 0.57–1)
EGFRCR SERPLBLD CKD-EPI 2021: 67.8 ML/MIN/1.73
EOSINOPHIL # BLD AUTO: 0.22 10*3/MM3 (ref 0–0.4)
EOSINOPHIL NFR BLD AUTO: 2.9 % (ref 0.3–6.2)
ERYTHROCYTE [DISTWIDTH] IN BLOOD BY AUTOMATED COUNT: 12.6 % (ref 12.3–15.4)
GLOBULIN SER CALC-MCNC: 2.4 GM/DL
GLUCOSE SERPL-MCNC: 91 MG/DL (ref 65–99)
HBA1C MFR BLD: 5.8 % (ref 4.8–5.6)
HCT VFR BLD AUTO: 36.1 % (ref 34–46.6)
HDLC SERPL-MCNC: 53 MG/DL (ref 40–60)
HGB BLD-MCNC: 11.8 G/DL (ref 12–15.9)
IMM GRANULOCYTES # BLD AUTO: 0.02 10*3/MM3 (ref 0–0.05)
IMM GRANULOCYTES NFR BLD AUTO: 0.3 % (ref 0–0.5)
LDLC SERPL CALC-MCNC: 87 MG/DL (ref 0–100)
LYMPHOCYTES # BLD AUTO: 1.26 10*3/MM3 (ref 0.7–3.1)
LYMPHOCYTES NFR BLD AUTO: 16.8 % (ref 19.6–45.3)
MCH RBC QN AUTO: 30.5 PG (ref 26.6–33)
MCHC RBC AUTO-ENTMCNC: 32.7 G/DL (ref 31.5–35.7)
MCV RBC AUTO: 93.3 FL (ref 79–97)
MONOCYTES # BLD AUTO: 0.41 10*3/MM3 (ref 0.1–0.9)
MONOCYTES NFR BLD AUTO: 5.5 % (ref 5–12)
NEUTROPHILS # BLD AUTO: 5.57 10*3/MM3 (ref 1.7–7)
NEUTROPHILS NFR BLD AUTO: 74 % (ref 42.7–76)
NRBC BLD AUTO-RTO: 0 /100 WBC (ref 0–0.2)
PLATELET # BLD AUTO: 328 10*3/MM3 (ref 140–450)
POTASSIUM SERPL-SCNC: 3.9 MMOL/L (ref 3.5–5.2)
PROT SERPL-MCNC: 5.8 G/DL (ref 6–8.5)
RBC # BLD AUTO: 3.87 10*6/MM3 (ref 3.77–5.28)
SODIUM SERPL-SCNC: 140 MMOL/L (ref 136–145)
T4 FREE SERPL-MCNC: 1.35 NG/DL (ref 0.93–1.7)
TRIGL SERPL-MCNC: 160 MG/DL (ref 0–150)
TSH SERPL DL<=0.005 MIU/L-ACNC: 0.38 UIU/ML (ref 0.27–4.2)
VLDLC SERPL CALC-MCNC: 27 MG/DL (ref 5–40)
WBC # BLD AUTO: 7.52 10*3/MM3 (ref 3.4–10.8)

## 2025-04-15 NOTE — TELEPHONE ENCOUNTER
Dr Causey:  Please let patient know that her labs for the most part look okay. Her hemoglobin is ever so slightly low but we will continue to watch this at future labs. A1c is in prediabetic range. We will continue to watch this at future labs. Her triglycerides are up somewhat. Encourage patient to minimize dietary choices high in fat. Patient's thyroid looks okay today. Will follow-up at next office visit.     Patient notified of results. WK

## 2025-04-22 ENCOUNTER — APPOINTMENT (OUTPATIENT)
Dept: BONE DENSITY | Facility: HOSPITAL | Age: 80
End: 2025-04-22
Payer: MEDICARE

## 2025-04-22 DIAGNOSIS — Z13.820 SCREENING FOR OSTEOPOROSIS: ICD-10-CM

## 2025-04-22 DIAGNOSIS — Z78.0 POSTMENOPAUSE: ICD-10-CM

## 2025-04-22 PROCEDURE — 77080 DXA BONE DENSITY AXIAL: CPT

## 2025-07-09 ENCOUNTER — OFFICE VISIT (OUTPATIENT)
Dept: FAMILY MEDICINE CLINIC | Facility: CLINIC | Age: 80
End: 2025-07-09
Payer: MEDICARE

## 2025-07-09 VITALS
SYSTOLIC BLOOD PRESSURE: 132 MMHG | DIASTOLIC BLOOD PRESSURE: 88 MMHG | BODY MASS INDEX: 22.49 KG/M2 | HEIGHT: 65 IN | HEART RATE: 58 BPM | RESPIRATION RATE: 16 BRPM | WEIGHT: 135 LBS | OXYGEN SATURATION: 100 %

## 2025-07-09 DIAGNOSIS — I10 ESSENTIAL HYPERTENSION: Primary | Chronic | ICD-10-CM

## 2025-07-09 DIAGNOSIS — E03.9 ACQUIRED HYPOTHYROIDISM: Chronic | ICD-10-CM

## 2025-07-09 DIAGNOSIS — E78.5 DYSLIPIDEMIA: ICD-10-CM

## 2025-07-09 PROCEDURE — 1160F RVW MEDS BY RX/DR IN RCRD: CPT | Performed by: STUDENT IN AN ORGANIZED HEALTH CARE EDUCATION/TRAINING PROGRAM

## 2025-07-09 PROCEDURE — 3075F SYST BP GE 130 - 139MM HG: CPT | Performed by: STUDENT IN AN ORGANIZED HEALTH CARE EDUCATION/TRAINING PROGRAM

## 2025-07-09 PROCEDURE — 1159F MED LIST DOCD IN RCRD: CPT | Performed by: STUDENT IN AN ORGANIZED HEALTH CARE EDUCATION/TRAINING PROGRAM

## 2025-07-09 PROCEDURE — 3079F DIAST BP 80-89 MM HG: CPT | Performed by: STUDENT IN AN ORGANIZED HEALTH CARE EDUCATION/TRAINING PROGRAM

## 2025-07-09 PROCEDURE — G2211 COMPLEX E/M VISIT ADD ON: HCPCS | Performed by: STUDENT IN AN ORGANIZED HEALTH CARE EDUCATION/TRAINING PROGRAM

## 2025-07-09 PROCEDURE — 99214 OFFICE O/P EST MOD 30 MIN: CPT | Performed by: STUDENT IN AN ORGANIZED HEALTH CARE EDUCATION/TRAINING PROGRAM

## 2025-07-09 PROCEDURE — 1126F AMNT PAIN NOTED NONE PRSNT: CPT | Performed by: STUDENT IN AN ORGANIZED HEALTH CARE EDUCATION/TRAINING PROGRAM

## 2025-07-09 RX ORDER — L.ACID/L.CASEI/B.BIF/B.LON/FOS 2B CELL-50
CAPSULE ORAL DAILY
COMMUNITY

## 2025-07-09 NOTE — PROGRESS NOTES
"    Follow Up Office Visit      Date: 2025   Patient Name: Faith Cisneros  : 1945   MRN: [id number]     Chief Complaint:    Chief Complaint   Patient presents with    Follow-up     3month  Hypertension and Hypothyroid.         History of Present Illness: Faith Cisneros is a 80 y.o. female who is here today for follow up.    Patient reports that she is doing well at this time without any complaints. Patient is trying to exercise by walking daily.    We do discuss patient blood pressure in office today.    We do review labs in office today.    Patient does not need refills on her medications today.    Subjective     I have reviewed the patients family history, social history, past medical history, past surgical history and have updated it as appropriate.     Medications:     Current Outpatient Medications:     atorvastatin (LIPITOR) 10 MG tablet, Take 1 tablet by mouth Daily., Disp: 90 tablet, Rfl: 3    B Complex Vitamins (VITAMIN B-COMPLEX 100 IJ), Daily., Disp: , Rfl:     Cholecalciferol (Vitamin D3) 25 MCG capsule, Daily., Disp: , Rfl:     NON FORMULARY, OTC allergy, Disp: , Rfl:     Probiotic Product (Probiotic Blend) capsule, Daily., Disp: , Rfl:     propranolol LA (INDERAL LA) 80 MG 24 hr capsule, Take 1 capsule by mouth Daily., Disp: 90 capsule, Rfl: 3    Synthroid 50 MCG tablet, TAKE 1 TABLET DAILY, Disp: 90 tablet, Rfl: 3    donepezil (ARICEPT) 5 MG tablet, Take 1 tablet by mouth Every Night. (Patient not taking: Reported on 2025), Disp: 90 tablet, Rfl: 2    Allergies:   No Known Allergies    Objective     Physical Exam:     Vital Signs:   Vitals:    25 0920   BP: 132/88   Pulse: 58   Resp: 16   SpO2: 100%   Weight: 61.2 kg (135 lb)   Height: 165.1 cm (65\")     Body mass index is 22.47 kg/m².  BMI is within normal parameters. No other follow-up for BMI required.       Physical Exam     General:  Well appearing elderly female in no acute distress. Alert. Vitals " reviewed.  Head/ENT: Atraumatic.   Neck: Anatomy appears symmetrical.   Cardiac: Regular rate and rhythm to auscultation.   Pulmonary: Lungs are clear to auscultation bilaterally.  Integumentary/Skin: Skin appears unremarkable from observable skin surfaces.   Neurological: Normal gait and speech.  Behavioral/Psych: Patient behavior/demeanor appears consistent with reported age.     Procedures      Assessment / Plan      1. Essential hypertension    2. Dyslipidemia    3. Acquired hypothyroidism     Assessment & Plan  1. Hypertension:  - Blood pressure slightly elevated in the office today  - If blood pressure remains elevated at follow-up visit in October, consider adding a small dose of lisinopril or another antihypertensive medication    2. Hyperlipidemia:  - Elevation in triglycerides on most recent lab, cholesterol levels are doing well  - Consider addition increasing atorvastatin or addition of fish oil at follow-up visit if needed on reassessment in October    3. Thyroid management:  - Most recent thyroid labs are normal  - Current dosage will be refilled when needed  - Thyroid levels to be rechecked at Medicare wellness visit in October 4. Health Maintenance:  - Medicare wellness visit in October    Follow-up:  - Patient to follow up in October     Patient or patient representative verbalized consent for the use of Ambient Listening during the visit with  Francisco Causey MD for chart documentation. 7/9/2025  09:44 EDT     Follow Up:   Return in about 3 months (around 10/10/2025).      MD ANGELY [facility]  [address] [place] [address] [address] [place] [phone]  Fax [phone]  Phone [phone]